# Patient Record
Sex: FEMALE | Race: WHITE | Employment: PART TIME | URBAN - METROPOLITAN AREA
[De-identification: names, ages, dates, MRNs, and addresses within clinical notes are randomized per-mention and may not be internally consistent; named-entity substitution may affect disease eponyms.]

---

## 2017-01-26 ENCOUNTER — OFFICE VISIT (OUTPATIENT)
Dept: FAMILY MEDICINE CLINIC | Facility: CLINIC | Age: 67
End: 2017-01-26

## 2017-01-26 VITALS
HEART RATE: 78 BPM | DIASTOLIC BLOOD PRESSURE: 84 MMHG | RESPIRATION RATE: 16 BRPM | BODY MASS INDEX: 29.71 KG/M2 | OXYGEN SATURATION: 98 % | SYSTOLIC BLOOD PRESSURE: 130 MMHG | HEIGHT: 63.5 IN | WEIGHT: 169.81 LBS | TEMPERATURE: 98 F

## 2017-01-26 DIAGNOSIS — Z23 NEED FOR TDAP VACCINATION: ICD-10-CM

## 2017-01-26 DIAGNOSIS — Z12.39 SCREENING FOR BREAST CANCER: ICD-10-CM

## 2017-01-26 DIAGNOSIS — E78.2 MIXED HYPERLIPIDEMIA: ICD-10-CM

## 2017-01-26 DIAGNOSIS — Z00.00 ROUTINE GENERAL MEDICAL EXAMINATION AT A HEALTH CARE FACILITY: Primary | ICD-10-CM

## 2017-01-26 DIAGNOSIS — K21.9 GASTROESOPHAGEAL REFLUX DISEASE WITHOUT ESOPHAGITIS: ICD-10-CM

## 2017-01-26 DIAGNOSIS — I25.10 CORONARY ARTERY DISEASE INVOLVING NATIVE CORONARY ARTERY OF NATIVE HEART WITHOUT ANGINA PECTORIS: ICD-10-CM

## 2017-01-26 DIAGNOSIS — Z00.00 BLOOD TESTS FOR ROUTINE GENERAL PHYSICAL EXAMINATION: ICD-10-CM

## 2017-01-26 PROBLEM — M21.621 BUNIONETTE OF RIGHT FOOT: Status: ACTIVE | Noted: 2017-01-26

## 2017-01-26 PROCEDURE — 90715 TDAP VACCINE 7 YRS/> IM: CPT | Performed by: FAMILY MEDICINE

## 2017-01-26 PROCEDURE — 90471 IMMUNIZATION ADMIN: CPT | Performed by: FAMILY MEDICINE

## 2017-01-26 PROCEDURE — 99397 PER PM REEVAL EST PAT 65+ YR: CPT | Performed by: FAMILY MEDICINE

## 2017-01-27 NOTE — PATIENT INSTRUCTIONS
Colonoscopy: Dr. Maday Alcantar from general surgery or [facility]. Let them know I sent you there so they will send a report back to me. Mammogram ordered. Fast 8 hours for labs. Water, black coffee, or plain tea only.   Any sugar in the system will alter the

## 2017-01-27 NOTE — PROGRESS NOTES
HPI:  Here for a physical.    PAST MEDICAL HISTORY:  Past Medical History   Diagnosis Date   • Allergic rhinitis    • Hyperlipidemia    • Cholelithiasis 10/21/2016   • Heart attack ([facility] [address] 1999     no intervention     PAST SURGICAL HISTORY:      Past Surgica Comment: social    Drug Use: No    Sexual Activity: Not on file   Not on file  Other Topics Concern   None on file     Social History Narrative        4 children    Never smoked     at [employer]    Occasional EtOH    No drugs         REVIEW OF SY in appearance, External canals patent and without inflammation. Bilateral tympanic membranes pearly white. No effusions noted. Hearing grossly normal.  EYES: PERRLA, EOMI, bilateral sclera anicteric, non-injected. Conjunctiva pink. Kyle Cliche   NOSE: Mucosa appears h diphtheria, pertussis booster : Updated today. Pneumovax if over 60 or medically indicated. Prevnar 13 followed 12 months later by pneumovax 23: Up-to-date  Shingles vaccine if over 60. Less expensive to obtain from pharmacy.   Up-to-date  [facility] informati

## 2017-02-13 ENCOUNTER — HOSPITAL ENCOUNTER (OUTPATIENT)
Dept: MAMMOGRAPHY | Age: 67
Discharge: HOME | End: 2017-02-13
Attending: FAMILY MEDICINE
Payer: COMMERCIAL

## 2017-02-13 ENCOUNTER — APPOINTMENT (OUTPATIENT)
Dept: LAB | Age: 67
End: 2017-02-13
Attending: FAMILY MEDICINE
Payer: COMMERCIAL

## 2017-02-13 DIAGNOSIS — Z00.00 BLOOD TESTS FOR ROUTINE GENERAL PHYSICAL EXAMINATION: ICD-10-CM

## 2017-02-13 DIAGNOSIS — I25.10 CORONARY ARTERY DISEASE INVOLVING NATIVE CORONARY ARTERY OF NATIVE HEART WITHOUT ANGINA PECTORIS: ICD-10-CM

## 2017-02-13 DIAGNOSIS — E78.2 MIXED HYPERLIPIDEMIA: ICD-10-CM

## 2017-02-13 DIAGNOSIS — Z12.39 SCREENING FOR BREAST CANCER: ICD-10-CM

## 2017-02-13 LAB
25-HYDROXYVITAMIN D (TOTAL): 34.8 NG/ML (ref 30–100)
ALBUMIN SERPL-MCNC: 3.3 G/DL (ref 3.5–4.8)
ALP LIVER SERPL-CCNC: 111 U/L (ref 55–142)
ALT SERPL-CCNC: 32 U/L (ref 14–54)
AST SERPL-CCNC: 22 U/L (ref 15–41)
BILIRUB SERPL-MCNC: 0.6 MG/DL (ref 0.1–2)
BUN BLD-MCNC: 16 MG/DL (ref 8–20)
CALCIUM BLD-MCNC: 8.4 MG/DL (ref 8.3–10.3)
CHLORIDE: 107 MMOL/L (ref 101–111)
CHOLEST SMN-MCNC: 123 MG/DL (ref ?–200)
CO2: 31 MMOL/L (ref 22–32)
CREAT BLD-MCNC: 0.96 MG/DL (ref 0.55–1.02)
GLUCOSE BLD-MCNC: 93 MG/DL (ref 70–99)
HDLC SERPL-MCNC: 66 MG/DL (ref 45–?)
HDLC SERPL: 1.86 {RATIO} (ref ?–4.44)
HEPATITIS C VIRUS AB INTERPRETATION: NONREACTIVE
LDLC SERPL CALC-MCNC: 41 MG/DL (ref ?–130)
M PROTEIN MFR SERPL ELPH: 7.1 G/DL (ref 6.1–8.3)
NONHDLC SERPL-MCNC: 57 MG/DL (ref ?–130)
POTASSIUM SERPL-SCNC: 4.1 MMOL/L (ref 3.6–5.1)
SODIUM SERPL-SCNC: 142 MMOL/L (ref 136–144)
TRIGLYCERIDES: 78 MG/DL (ref ?–150)
VLDL: 16 MG/DL (ref 5–40)

## 2017-02-13 PROCEDURE — 77067 SCR MAMMO BI INCL CAD: CPT

## 2017-02-13 PROCEDURE — 80061 LIPID PANEL: CPT

## 2017-02-13 PROCEDURE — 82306 VITAMIN D 25 HYDROXY: CPT

## 2017-02-13 PROCEDURE — 80053 COMPREHEN METABOLIC PANEL: CPT

## 2017-02-13 PROCEDURE — 77063 BREAST TOMOSYNTHESIS BI: CPT

## 2017-02-13 PROCEDURE — 86803 HEPATITIS C AB TEST: CPT

## 2017-02-13 PROCEDURE — 36415 COLL VENOUS BLD VENIPUNCTURE: CPT

## 2017-05-22 PROCEDURE — 87088 URINE BACTERIA CULTURE: CPT | Performed by: UROLOGY

## 2017-05-22 PROCEDURE — 81015 MICROSCOPIC EXAM OF URINE: CPT | Performed by: UROLOGY

## 2017-05-22 PROCEDURE — 87086 URINE CULTURE/COLONY COUNT: CPT | Performed by: UROLOGY

## 2017-05-22 PROCEDURE — 87186 SC STD MICRODIL/AGAR DIL: CPT | Performed by: UROLOGY

## 2017-08-08 ENCOUNTER — LAB ENCOUNTER (OUTPATIENT)
Dept: LAB | Age: 67
End: 2017-08-08
Attending: FAMILY MEDICINE
Payer: COMMERCIAL

## 2017-08-08 ENCOUNTER — OFFICE VISIT (OUTPATIENT)
Dept: FAMILY MEDICINE CLINIC | Facility: CLINIC | Age: 67
End: 2017-08-08

## 2017-08-08 VITALS
RESPIRATION RATE: 16 BRPM | TEMPERATURE: 99 F | HEIGHT: 65 IN | HEART RATE: 70 BPM | DIASTOLIC BLOOD PRESSURE: 76 MMHG | SYSTOLIC BLOOD PRESSURE: 104 MMHG | WEIGHT: 169 LBS | BODY MASS INDEX: 28.16 KG/M2

## 2017-08-08 DIAGNOSIS — I25.10 CORONARY ARTERY DISEASE INVOLVING NATIVE CORONARY ARTERY OF NATIVE HEART WITHOUT ANGINA PECTORIS: Primary | ICD-10-CM

## 2017-08-08 DIAGNOSIS — H61.23 BILATERAL IMPACTED CERUMEN: ICD-10-CM

## 2017-08-08 DIAGNOSIS — R94.31 ABNORMAL EKG: ICD-10-CM

## 2017-08-08 DIAGNOSIS — N39.0 RECURRENT UTI (URINARY TRACT INFECTION): ICD-10-CM

## 2017-08-08 DIAGNOSIS — E78.2 MIXED HYPERLIPIDEMIA: ICD-10-CM

## 2017-08-08 DIAGNOSIS — G89.29 CHRONIC MIDLINE LOW BACK PAIN WITHOUT SCIATICA: ICD-10-CM

## 2017-08-08 DIAGNOSIS — M54.50 CHRONIC MIDLINE LOW BACK PAIN WITHOUT SCIATICA: ICD-10-CM

## 2017-08-08 DIAGNOSIS — I25.10 CORONARY ARTERY DISEASE INVOLVING NATIVE CORONARY ARTERY OF NATIVE HEART WITHOUT ANGINA PECTORIS: ICD-10-CM

## 2017-08-08 LAB
ALBUMIN SERPL-MCNC: 3.4 G/DL (ref 3.5–4.8)
ALP LIVER SERPL-CCNC: 114 U/L (ref 55–142)
ALT SERPL-CCNC: 31 U/L (ref 14–54)
AST SERPL-CCNC: 20 U/L (ref 15–41)
BILIRUB SERPL-MCNC: 0.6 MG/DL (ref 0.1–2)
BILIRUB UR QL STRIP.AUTO: NEGATIVE
BUN BLD-MCNC: 18 MG/DL (ref 8–20)
CALCIUM BLD-MCNC: 9 MG/DL (ref 8.3–10.3)
CHLORIDE: 109 MMOL/L (ref 101–111)
CHOLEST SMN-MCNC: 121 MG/DL (ref ?–200)
CO2: 28 MMOL/L (ref 22–32)
COLOR UR AUTO: YELLOW
CREAT BLD-MCNC: 1.02 MG/DL (ref 0.55–1.02)
GLUCOSE BLD-MCNC: 86 MG/DL (ref 70–99)
GLUCOSE UR STRIP.AUTO-MCNC: NEGATIVE MG/DL
HDLC SERPL-MCNC: 55 MG/DL (ref 45–?)
HDLC SERPL: 2.2 {RATIO} (ref ?–4.44)
KETONES UR STRIP.AUTO-MCNC: NEGATIVE MG/DL
LDLC SERPL CALC-MCNC: 46 MG/DL (ref ?–130)
LDLC SERPL-MCNC: 20 MG/DL (ref 5–40)
M PROTEIN MFR SERPL ELPH: 7.3 G/DL (ref 6.1–8.3)
NITRITE UR QL STRIP.AUTO: POSITIVE
NONHDLC SERPL-MCNC: 66 MG/DL (ref ?–130)
PH UR STRIP.AUTO: 6 [PH] (ref 4.5–8)
POTASSIUM SERPL-SCNC: 4.3 MMOL/L (ref 3.6–5.1)
PROT UR STRIP.AUTO-MCNC: NEGATIVE MG/DL
RBC #/AREA URNS AUTO: >10 /HPF
SODIUM SERPL-SCNC: 144 MMOL/L (ref 136–144)
SP GR UR STRIP.AUTO: 1.01 (ref 1–1.03)
TRIGLYCERIDES: 100 MG/DL (ref ?–150)
UROBILINOGEN UR STRIP.AUTO-MCNC: <2 MG/DL
WBC #/AREA URNS AUTO: >50 /HPF
WBC CLUMPS UR QL AUTO: PRESENT

## 2017-08-08 PROCEDURE — 80061 LIPID PANEL: CPT

## 2017-08-08 PROCEDURE — 36415 COLL VENOUS BLD VENIPUNCTURE: CPT

## 2017-08-08 PROCEDURE — 99214 OFFICE O/P EST MOD 30 MIN: CPT | Performed by: FAMILY MEDICINE

## 2017-08-08 PROCEDURE — 80053 COMPREHEN METABOLIC PANEL: CPT

## 2017-08-08 PROCEDURE — 81001 URINALYSIS AUTO W/SCOPE: CPT

## 2017-08-08 NOTE — PATIENT INSTRUCTIONS
Cerumenex or Debrox to both ears every night for 3 nights will help to loosen up wax. You may do this once a month.     Call [phone] to schedule your exam.  MRI lumbar spine

## 2017-08-08 NOTE — PROGRESS NOTES
Here for laboratories for hyperlipidemia. History of coronary artery disease followed by cardiology. Dr. Chaparro Lees has the lipid panel pending. Has chronic ringing in the ears. Sometimes gets a discomfort. No discharge.     Has history of recurrent urinar Vitamins-Minerals (MULTIVITAL OR) Take 1 tablet by mouth daily. Disp:  Rfl:    aspirin 81 MG Oral Tab Take 81 mg by mouth daily.  Disp:  Rfl:      ALLERGIES:   Morphine; Sulfa Antibiotics  FAMILY HISTORY  Family History   Problem Relation Age of Onset   •

## 2017-08-09 ENCOUNTER — TELEPHONE (OUTPATIENT)
Dept: FAMILY MEDICINE CLINIC | Facility: CLINIC | Age: 67
End: 2017-08-09

## 2017-08-09 DIAGNOSIS — N39.0 URINARY TRACT INFECTION WITH HEMATURIA, SITE UNSPECIFIED: Primary | ICD-10-CM

## 2017-08-09 DIAGNOSIS — R31.9 URINARY TRACT INFECTION WITH HEMATURIA, SITE UNSPECIFIED: Primary | ICD-10-CM

## 2017-08-09 RX ORDER — NITROFURANTOIN 25; 75 MG/1; MG/1
100 CAPSULE ORAL 2 TIMES DAILY
Qty: 10 CAPSULE | Refills: 0 | Status: SHIPPED | OUTPATIENT
Start: 2017-08-09 | End: 2017-08-14 | Stop reason: WASHOUT

## 2017-08-09 NOTE — TELEPHONE ENCOUNTER
----- Message from Karina Donahue MD sent at 8/9/2017  8:06 AM CDT -----  History of frequent UTI. Patient is asymptomatic but urinalysis is strongly positive for infection. Allergic to sulfa. Start Macrobid 100 mg twice a day for 5 days.   Recheck urin

## 2017-08-17 ENCOUNTER — LAB ENCOUNTER (OUTPATIENT)
Dept: LAB | Age: 67
End: 2017-08-17
Attending: FAMILY MEDICINE
Payer: COMMERCIAL

## 2017-08-17 DIAGNOSIS — N39.0 URINARY TRACT INFECTION WITH HEMATURIA, SITE UNSPECIFIED: ICD-10-CM

## 2017-08-17 DIAGNOSIS — R31.9 URINARY TRACT INFECTION WITH HEMATURIA, SITE UNSPECIFIED: ICD-10-CM

## 2017-08-17 LAB
BILIRUB UR QL STRIP.AUTO: NEGATIVE
CLARITY UR REFRACT.AUTO: CLEAR
COLOR UR AUTO: YELLOW
GLUCOSE UR STRIP.AUTO-MCNC: NEGATIVE MG/DL
KETONES UR STRIP.AUTO-MCNC: NEGATIVE MG/DL
NITRITE UR QL STRIP.AUTO: NEGATIVE
PH UR STRIP.AUTO: 6 [PH] (ref 4.5–8)
PROT UR STRIP.AUTO-MCNC: NEGATIVE MG/DL
RBC UR QL AUTO: NEGATIVE
SP GR UR STRIP.AUTO: 1.01 (ref 1–1.03)
UROBILINOGEN UR STRIP.AUTO-MCNC: <2 MG/DL

## 2017-08-17 PROCEDURE — 87086 URINE CULTURE/COLONY COUNT: CPT | Performed by: FAMILY MEDICINE

## 2017-08-17 PROCEDURE — 81001 URINALYSIS AUTO W/SCOPE: CPT | Performed by: FAMILY MEDICINE

## 2017-08-22 ENCOUNTER — HOSPITAL ENCOUNTER (OUTPATIENT)
Dept: MRI IMAGING | Age: 67
Discharge: HOME | End: 2017-08-22
Attending: FAMILY MEDICINE
Payer: COMMERCIAL

## 2017-08-22 ENCOUNTER — TELEPHONE (OUTPATIENT)
Dept: FAMILY MEDICINE CLINIC | Facility: CLINIC | Age: 67
End: 2017-08-22

## 2017-08-22 DIAGNOSIS — M54.50 CHRONIC MIDLINE LOW BACK PAIN WITHOUT SCIATICA: ICD-10-CM

## 2017-08-22 DIAGNOSIS — G89.29 CHRONIC MIDLINE LOW BACK PAIN WITHOUT SCIATICA: ICD-10-CM

## 2017-08-22 PROCEDURE — 72148 MRI LUMBAR SPINE W/O DYE: CPT | Performed by: FAMILY MEDICINE

## 2017-08-22 NOTE — TELEPHONE ENCOUNTER
See phone call for spine recommendations. Bilateral hydronephrosis.   Recommend CT of the abdomen without contrast.

## 2017-08-22 NOTE — TELEPHONE ENCOUNTER
Ilir Shelton at Radiology Dept phoned in stating Radiologist request a call to office for [facility] to review recent MRI report. Available in Epic for review.

## 2017-09-25 ENCOUNTER — OFFICE VISIT (OUTPATIENT)
Dept: SURGERY | Facility: CLINIC | Age: 67
End: 2017-09-25

## 2017-09-25 VITALS
DIASTOLIC BLOOD PRESSURE: 62 MMHG | HEIGHT: 65 IN | WEIGHT: 170 LBS | BODY MASS INDEX: 28.32 KG/M2 | HEART RATE: 82 BPM | SYSTOLIC BLOOD PRESSURE: 118 MMHG

## 2017-09-25 DIAGNOSIS — M51.36 DDD (DEGENERATIVE DISC DISEASE), LUMBAR: ICD-10-CM

## 2017-09-25 DIAGNOSIS — M43.16 SPONDYLOLISTHESIS OF LUMBAR REGION: ICD-10-CM

## 2017-09-25 DIAGNOSIS — M54.50 CHRONIC LOW BACK PAIN WITHOUT SCIATICA, UNSPECIFIED BACK PAIN LATERALITY: Primary | ICD-10-CM

## 2017-09-25 DIAGNOSIS — G89.29 CHRONIC LOW BACK PAIN WITHOUT SCIATICA, UNSPECIFIED BACK PAIN LATERALITY: Primary | ICD-10-CM

## 2017-09-25 DIAGNOSIS — M47.816 FACET ARTHRITIS OF LUMBAR REGION: ICD-10-CM

## 2017-09-25 PROCEDURE — 99214 OFFICE O/P EST MOD 30 MIN: CPT | Performed by: PHYSICIAN ASSISTANT

## 2017-09-25 NOTE — PROGRESS NOTES
Location of Pain: middle of low back, and tingling in bottems of feet    Date Pain Began: couple of years          Work Related:   No        Receiving Work Comp/Disability:   No    Numeric Rating Scale:  Pain at Present:  3

## 2017-09-25 NOTE — H&P
NEUROSURGERY CLINIC VISIT      Bolivar Clement  2/9/1950      Patient presents with:  Low Back Pain        HPI:   Bolivar Clement is a 75year old female with PMH of angioplasty (no stents, 1999), 10/21/2016   • Heart attack ([facility] [address] 1999    no intervention   • Hyperlipidemia       Past Surgical History:  No date: ANGIOGRAM      Comment: no stents  2005: APPENDECTOMY  2016: CATARACT  1/17/2011: COLONOSCOPY      Comment: Dr. Parag Lora, 1 polyp  11/15/2016: C Wt 170 lb   BMI 28.29 kg/m²   GENERAL: Alert and oriented to person place time event.  Pleasant, no apparent distress. HEART: Regular rate and rhythm  LUNGS: Easy and even  NEUROLOGICAL:  This patient is alert and orientated x 3. Speech fluent.  Comprehe acquired in sagittal and axial planes. FINDINGS:    There is normal lumbar lordosis with trace anterolisthesis of L4 and L5 by 5 mm and trace retrolisthesis of L5 on S1 by 2 mm. Vertebral body heights are well-maintained.  Desiccation and disc height spinal canal stenosis with right greater the left subarticular zone narrowing. There is no significant neural foraminal stenosis. L5-S1: Diffuse disc bulge with endplate osteophytes and a superimposed left foraminal/far lateral disc osteophyte complex. seeing the chiropractor, as patient states it provides relief. 4. Follow up in 6 weeks or sooner for any new, worsening or concerning signs or symptoms.  Red flag or warning signs were discussed, including but not limited to, bladder/bowel incontinence/ret

## 2017-09-25 NOTE — PATIENT INSTRUCTIONS
Refill policies:    • Allow 2-3 business days for refills; controlled substances may take longer.   • Contact your pharmacy at least 5 days prior to running out of medication and have them send an electronic request or submit request through the [facility] have a procedure or additional testing performed. [facility]) will contact your insurance carrier to obtain pre-certification or prior authorization.     Unfortunately, [facility] has seen an increase in denial of payment even though the p

## 2017-09-27 ENCOUNTER — TELEPHONE (OUTPATIENT)
Dept: FAMILY MEDICINE CLINIC | Facility: CLINIC | Age: 67
End: 2017-09-27

## 2017-09-27 DIAGNOSIS — R93.89 ABNORMAL MRI: Primary | ICD-10-CM

## 2017-09-27 DIAGNOSIS — N13.30 HYDRONEPHROSIS, UNSPECIFIED HYDRONEPHROSIS TYPE: ICD-10-CM

## 2017-09-27 NOTE — TELEPHONE ENCOUNTER
MRI of the lumbar spine dated August 22, 2017 shows hydronephrosis of the kidneys. Ultrasound of the kidneys from February does not show this finding. Radiology recommended a CT of the kidneys to assess causes of hydronephrosis.   CT without contrast.

## 2017-09-27 NOTE — TELEPHONE ENCOUNTER
PT STATES THAT [facility] WANTS HER TO HAVE A CT OF KIDNEYS. SHE WOULD LIKE AN ORDER FOR THAT PUT IN THE SYSTEM. CALL HER WHEN ORDER IN SYSTEM.

## 2017-09-28 NOTE — TELEPHONE ENCOUNTER
Entered order, [facility] for pt with this information and pre-authorization process. Task completed.

## 2017-10-04 ENCOUNTER — TELEPHONE (OUTPATIENT)
Dept: FAMILY MEDICINE CLINIC | Facility: CLINIC | Age: 67
End: 2017-10-04

## 2017-10-04 DIAGNOSIS — R39.89 URINARY PROBLEM: Primary | ICD-10-CM

## 2017-10-04 NOTE — TELEPHONE ENCOUNTER
Pt called stated she has a history of uti problems and pt is experiencing symptoms of a uti, pt is asking if she can get an order to provide a sample by today. pt was just here not too long ago. Please call pt and advise.

## 2017-10-04 NOTE — TELEPHONE ENCOUNTER
Order entered in [employer] system. LMOM for pt with this information. Task completed.

## 2017-10-04 NOTE — TELEPHONE ENCOUNTER
See attached message. Recurrent UTI in pt's problem list.  Last OV RC 8-8-17 RC CAD. Last Urinalysis and CX 8-17-17 Negative. Approve for UA/CX orders?

## 2017-10-05 ENCOUNTER — LAB ENCOUNTER (OUTPATIENT)
Dept: LAB | Age: 67
End: 2017-10-05
Attending: FAMILY MEDICINE
Payer: COMMERCIAL

## 2017-10-05 DIAGNOSIS — R39.89 URINARY PROBLEM: ICD-10-CM

## 2017-10-05 PROCEDURE — 87086 URINE CULTURE/COLONY COUNT: CPT | Performed by: FAMILY MEDICINE

## 2017-10-05 PROCEDURE — 87088 URINE BACTERIA CULTURE: CPT | Performed by: FAMILY MEDICINE

## 2017-10-05 PROCEDURE — 81001 URINALYSIS AUTO W/SCOPE: CPT | Performed by: FAMILY MEDICINE

## 2017-10-05 PROCEDURE — 87186 SC STD MICRODIL/AGAR DIL: CPT | Performed by: FAMILY MEDICINE

## 2017-10-05 RX ORDER — NITROFURANTOIN 25; 75 MG/1; MG/1
100 CAPSULE ORAL 2 TIMES DAILY
Qty: 14 CAPSULE | Refills: 0 | Status: SHIPPED | OUTPATIENT
Start: 2017-10-05 | End: 2017-10-12 | Stop reason: WASHOUT

## 2017-10-09 ENCOUNTER — HOSPITAL ENCOUNTER (OUTPATIENT)
Dept: CT IMAGING | Age: 67
Discharge: HOME | End: 2017-10-09
Attending: FAMILY MEDICINE
Payer: COMMERCIAL

## 2017-10-09 DIAGNOSIS — N13.30 HYDRONEPHROSIS, UNSPECIFIED HYDRONEPHROSIS TYPE: ICD-10-CM

## 2017-10-09 DIAGNOSIS — R93.89 ABNORMAL MRI: ICD-10-CM

## 2017-10-09 PROCEDURE — 74176 CT ABD & PELVIS W/O CONTRAST: CPT | Performed by: FAMILY MEDICINE

## 2017-10-19 ENCOUNTER — OFFICE VISIT (OUTPATIENT)
Dept: FAMILY MEDICINE CLINIC | Facility: CLINIC | Age: 67
End: 2017-10-19

## 2017-10-19 ENCOUNTER — TELEPHONE (OUTPATIENT)
Dept: FAMILY MEDICINE CLINIC | Facility: CLINIC | Age: 67
End: 2017-10-19

## 2017-10-19 VITALS
DIASTOLIC BLOOD PRESSURE: 80 MMHG | BODY MASS INDEX: 28.99 KG/M2 | OXYGEN SATURATION: 95 % | HEIGHT: 65 IN | HEART RATE: 82 BPM | TEMPERATURE: 98 F | SYSTOLIC BLOOD PRESSURE: 138 MMHG | WEIGHT: 174 LBS | RESPIRATION RATE: 20 BRPM

## 2017-10-19 DIAGNOSIS — N39.0 URINARY TRACT INFECTION WITHOUT HEMATURIA, SITE UNSPECIFIED: Primary | ICD-10-CM

## 2017-10-19 PROCEDURE — 99213 OFFICE O/P EST LOW 20 MIN: CPT | Performed by: FAMILY MEDICINE

## 2017-10-19 PROCEDURE — 87086 URINE CULTURE/COLONY COUNT: CPT | Performed by: FAMILY MEDICINE

## 2017-10-19 PROCEDURE — 81003 URINALYSIS AUTO W/O SCOPE: CPT | Performed by: FAMILY MEDICINE

## 2017-10-19 RX ORDER — AZITHROMYCIN 250 MG/1
TABLET, FILM COATED ORAL
Qty: 6 TABLET | Refills: 0 | Status: SHIPPED | OUTPATIENT
Start: 2017-10-19 | End: 2017-10-26 | Stop reason: ALTCHOICE

## 2017-10-19 NOTE — PROGRESS NOTES
Here with recurrent UTI symptoms. Treated her with antibiotics in the last few weeks. No vaginal discharge but feels different down there. [place] earlier in the week or last week and required Azo to control the UTI symptoms.   She does Disp:  Rfl:      ALLERGIES:   Morphine; Sulfa Antibiotics  FAMILY HISTORY  Family History   Problem Relation Age of Onset   • Cancer Mother      liver   • cirrhosis [OTHER] Mother    • gallbladder attack Jimi Hurtado Mother    • Heart Attack Paternal Grandfathe

## 2017-10-19 NOTE — TELEPHONE ENCOUNTER
Pt was wondering if there is an antibiotic she can take on a regular basis to help prevent her  recurrent UTIs. He urologist suggested that she ask her PCP about it. Pt forgot to ask when she was in for office visit.  Please advise

## 2017-10-23 ENCOUNTER — OFFICE VISIT (OUTPATIENT)
Dept: PHYSICAL THERAPY | Age: 67
End: 2017-10-23
Attending: NEUROLOGICAL SURGERY
Payer: COMMERCIAL

## 2017-10-23 PROCEDURE — 97112 NEUROMUSCULAR REEDUCATION: CPT

## 2017-10-23 PROCEDURE — 97161 PT EVAL LOW COMPLEX 20 MIN: CPT

## 2017-10-24 NOTE — PROGRESS NOTES
SPINE EVALUATION:   Referring Physician: Dr. Linda Gaspar  Diagnosis: Localized LBP  Date of Service: 10/23/17     PATIENT SUMMARY   Arnoldo Moura is a 75year old y/o female who presents to therapy today with complaints of localized/central low back pain whic to tolerate standing at work full shift without need for ibuprofen or ice at the end of the day. Precautions:  None  OBJECTIVE:   Observation/Posture: sitting comfortably during evaluation.  Decreased core engagement in unsupported sitting  Gait: unremar PT (6 visits)    Frequency / Duration: Patient will be seen for 1x/week or biweekly for a total of 6 visits over a 90 day period. Treatment will include: Manual Therapy; Therapeutic Exercises; Neuromuscular Re-education;  Therapeutic Activity;  HEP    Adriana Emanuelver

## 2017-10-26 ENCOUNTER — OFFICE VISIT (OUTPATIENT)
Dept: PHYSICAL THERAPY | Age: 67
End: 2017-10-26
Attending: NEUROLOGICAL SURGERY
Payer: COMMERCIAL

## 2017-10-26 ENCOUNTER — OFFICE VISIT (OUTPATIENT)
Dept: NEPHROLOGY | Facility: CLINIC | Age: 67
End: 2017-10-26

## 2017-10-26 VITALS — SYSTOLIC BLOOD PRESSURE: 108 MMHG | WEIGHT: 173 LBS | DIASTOLIC BLOOD PRESSURE: 66 MMHG | BODY MASS INDEX: 29 KG/M2

## 2017-10-26 DIAGNOSIS — N28.89 PELVICALIECTASIS: ICD-10-CM

## 2017-10-26 DIAGNOSIS — N28.1 PARAPELVIC RENAL CYST: Primary | ICD-10-CM

## 2017-10-26 DIAGNOSIS — N39.0 URINARY TRACT INFECTION WITHOUT HEMATURIA, SITE UNSPECIFIED: ICD-10-CM

## 2017-10-26 PROCEDURE — 97112 NEUROMUSCULAR REEDUCATION: CPT

## 2017-10-26 PROCEDURE — 99243 OFF/OP CNSLTJ NEW/EST LOW 30: CPT | Performed by: INTERNAL MEDICINE

## 2017-10-26 NOTE — PROGRESS NOTES
Dx: Localized LBP         Authorized # of Visits:  Medicare/BCBS         Next MD visit: none scheduled  Fall Risk: standard         Precautions: n/a             Subjective: Did the HEP a couple times; has a hard time on her knees for the alt UE/LE exercise Seated on SB pelvic tilts* A/P and M/L x10 ea (mild sore A/P)         Seated on SB  -March 2>0 UE x20 (fair)*  -alt UE/LE x15 (fair)*  -green spri rows 2x10         Tandem stance x30s ea (good)  -with EC x30s ea (fair)         airex march x20 (fair)

## 2017-10-26 NOTE — PROGRESS NOTES
Nephrology Consult Note    REASON FOR CONSULT: Abnormal CT findings; frequent UTIs    ASSESSMENT/PLAN:        1) Abnormal CT findings- has chronic findings of bilateral parapelvic cysts and possible mild UPJ obstruction / pelvicaliectasis which have been p habits or gross hematuria  Denies LE edema  Denies skin rashes/myalgias/arthralgias    PMH:  Past Medical History:   Diagnosis Date   • Allergic rhinitis    • Cholelithiasis 10/21/2016   • Heart attack 1999    no intervention   • Hyperlipidemia        PSH: Alcohol use:  Yes                Comment: social    Drug use: No            Social History Narrative        4 children    Never smoked     at [employer]    Occasional EtOH    No drugs         Family History:  Family History   Problem Relation Age

## 2017-11-02 ENCOUNTER — OFFICE VISIT (OUTPATIENT)
Dept: PHYSICAL THERAPY | Age: 67
End: 2017-11-02
Attending: NEUROLOGICAL SURGERY
Payer: COMMERCIAL

## 2017-11-02 PROCEDURE — 97112 NEUROMUSCULAR REEDUCATION: CPT

## 2017-11-02 PROCEDURE — 97140 MANUAL THERAPY 1/> REGIONS: CPT

## 2017-11-02 NOTE — PROGRESS NOTES
Dx: Localized LBP         Authorized # of Visits:  Medicare/BCBS         Next MD visit: none scheduled  Fall Risk: standard         Precautions: n/a             Subjective: Did the HEP most days; did not do it one day because knees were acting up.  Exercise TX#: 7/ Date:               TX#: 8/   NuStep L4 x4 min x5 min        SB review:  -DKTC  -LTR  -Bridges  -SLR Bridges with YTB Abd x10        Supine TA* 5s hold x10 hooklying dead bug x15        Supine TA with march* x15 x20        Supine TA with SLR* x15 x

## 2017-11-06 ENCOUNTER — OFFICE VISIT (OUTPATIENT)
Dept: SURGERY | Facility: CLINIC | Age: 67
End: 2017-11-06

## 2017-11-06 VITALS
WEIGHT: 170 LBS | HEART RATE: 72 BPM | HEIGHT: 65 IN | SYSTOLIC BLOOD PRESSURE: 122 MMHG | BODY MASS INDEX: 28.32 KG/M2 | DIASTOLIC BLOOD PRESSURE: 70 MMHG

## 2017-11-06 DIAGNOSIS — M51.36 DDD (DEGENERATIVE DISC DISEASE), LUMBAR: ICD-10-CM

## 2017-11-06 DIAGNOSIS — M47.816 FACET ARTHRITIS OF LUMBAR REGION: ICD-10-CM

## 2017-11-06 DIAGNOSIS — G89.29 CHRONIC LOW BACK PAIN WITHOUT SCIATICA, UNSPECIFIED BACK PAIN LATERALITY: Primary | ICD-10-CM

## 2017-11-06 DIAGNOSIS — M54.50 CHRONIC LOW BACK PAIN WITHOUT SCIATICA, UNSPECIFIED BACK PAIN LATERALITY: Primary | ICD-10-CM

## 2017-11-06 DIAGNOSIS — M43.16 SPONDYLOLISTHESIS OF LUMBAR REGION: ICD-10-CM

## 2017-11-06 PROCEDURE — 99214 OFFICE O/P EST MOD 30 MIN: CPT | Performed by: PHYSICIAN ASSISTANT

## 2017-11-06 NOTE — PATIENT INSTRUCTIONS
Refill policies:    • Allow 2-3 business days for refills; controlled substances may take longer.   • Contact your pharmacy at least 5 days prior to running out of medication and have them send an electronic request or submit request through the [facility] have a procedure or additional testing performed. [facility]) will contact your insurance carrier to obtain pre-certification or prior authorization.     Unfortunately, [facility] has seen an increase in denial of payment even though the p

## 2017-11-06 NOTE — PROGRESS NOTES
Neurosurgery   Lumbar Follow up      HISTORY OF PRESENT ILLNESS:Grace Verduzco is a 75year old female who presents for imaging results.  Patient states she has been attending physical therapy and has noticed her back pain and burning/cramping of right thig Chronic low back pain without sciatica, unspecified back pain laterality  (primary encounter diagnosis)    (M51.36) DDD (degenerative disc disease), lumbar    (M43.16) Spondylolisthesis of lumbar region    (M46.96) Facet arthritis of lumbar region ([facility] [address]

## 2017-11-13 ENCOUNTER — TELEPHONE (OUTPATIENT)
Dept: FAMILY MEDICINE CLINIC | Facility: CLINIC | Age: 67
End: 2017-11-13

## 2017-11-13 RX ORDER — NITROFURANTOIN 25; 75 MG/1; MG/1
100 CAPSULE ORAL DAILY
Qty: 30 CAPSULE | Refills: 2 | Status: SHIPPED | OUTPATIENT
Start: 2017-11-13 | End: 2018-02-21

## 2017-11-16 ENCOUNTER — APPOINTMENT (OUTPATIENT)
Dept: PHYSICAL THERAPY | Age: 67
End: 2017-11-16
Attending: NEUROLOGICAL SURGERY
Payer: COMMERCIAL

## 2017-11-27 ENCOUNTER — APPOINTMENT (OUTPATIENT)
Dept: PHYSICAL THERAPY | Age: 67
End: 2017-11-27
Attending: NEUROLOGICAL SURGERY
Payer: COMMERCIAL

## 2017-11-30 ENCOUNTER — OFFICE VISIT (OUTPATIENT)
Dept: PHYSICAL THERAPY | Age: 67
End: 2017-11-30
Attending: NEUROLOGICAL SURGERY
Payer: COMMERCIAL

## 2017-11-30 PROCEDURE — 97140 MANUAL THERAPY 1/> REGIONS: CPT

## 2017-11-30 PROCEDURE — 97112 NEUROMUSCULAR REEDUCATION: CPT

## 2017-12-01 NOTE — PROGRESS NOTES
Discharge Summary  Pt has attended 4 visits in Physical Therapy. Dx: Localized LBP         Authorized # of Visits:  Medicare/BCBS         Subjective/Assessment: Back has been good overall since last visit. Does the HEP, but not every day.  Has been abl have any questions, please contact me at Dept: [phone].     Sincerely,  Electronically signed by therapist: Shira Lux, PT      Date: 10/26/2017 TX#: 2/6 Date: 11/2/2017 TX#: 3/6   Date: 11/30/2017 TX#: 4/6 Date:               TX#: 5/ Date:

## 2018-02-21 ENCOUNTER — HOSPITAL ENCOUNTER (OUTPATIENT)
Dept: GENERAL RADIOLOGY | Age: 68
Discharge: HOME | End: 2018-02-21
Attending: FAMILY MEDICINE
Payer: COMMERCIAL

## 2018-02-21 ENCOUNTER — TELEPHONE (OUTPATIENT)
Dept: FAMILY MEDICINE CLINIC | Facility: CLINIC | Age: 68
End: 2018-02-21

## 2018-02-21 ENCOUNTER — OFFICE VISIT (OUTPATIENT)
Dept: FAMILY MEDICINE CLINIC | Facility: CLINIC | Age: 68
End: 2018-02-21

## 2018-02-21 VITALS
TEMPERATURE: 98 F | DIASTOLIC BLOOD PRESSURE: 78 MMHG | OXYGEN SATURATION: 98 % | HEIGHT: 65 IN | SYSTOLIC BLOOD PRESSURE: 118 MMHG | WEIGHT: 175 LBS | HEART RATE: 80 BPM | BODY MASS INDEX: 29.16 KG/M2 | RESPIRATION RATE: 18 BRPM

## 2018-02-21 DIAGNOSIS — Z00.00 ROUTINE GENERAL MEDICAL EXAMINATION AT A HEALTH CARE FACILITY: Primary | ICD-10-CM

## 2018-02-21 DIAGNOSIS — M54.16 LUMBAR BACK PAIN WITH RADICULOPATHY AFFECTING RIGHT LOWER EXTREMITY: ICD-10-CM

## 2018-02-21 DIAGNOSIS — N39.0 RECURRENT UTI (URINARY TRACT INFECTION): ICD-10-CM

## 2018-02-21 DIAGNOSIS — Z12.39 SCREENING FOR BREAST CANCER: ICD-10-CM

## 2018-02-21 PROCEDURE — 99397 PER PM REEVAL EST PAT 65+ YR: CPT | Performed by: FAMILY MEDICINE

## 2018-02-21 PROCEDURE — 72110 X-RAY EXAM L-2 SPINE 4/>VWS: CPT | Performed by: FAMILY MEDICINE

## 2018-02-21 RX ORDER — NITROFURANTOIN 25; 75 MG/1; MG/1
100 CAPSULE ORAL DAILY
Qty: 90 CAPSULE | Refills: 3 | Status: SHIPPED | OUTPATIENT
Start: 2018-02-21 | End: 2019-05-30

## 2018-02-21 NOTE — PROGRESS NOTES
HPI:  Here for a physical.    PAST MEDICAL HISTORY:  Past Medical History:   Diagnosis Date   • Allergic rhinitis    • Cholelithiasis 10/21/2016   • Heart attack ([facility] [address] 1999    no intervention   • Hyperlipidemia      PAST SURGICAL HISTORY:  Past Surgical Hi Marital status:   Spouse name: N/A    Years of education: N/A  Number of children: N/A     Occupational History  None on file     Social History Main Topics   Smoking status: Never Smoker    Smokeless tobacco: Never Used    Alcohol use Yes    Commen developed, well nourished, in no apparent distress. EARS: Bilateral pinna / tragus are WNL in appearance. External canals patent and without inflammation. Bilateral tympanic membranes pearly white. No effusions noted.  Hearing grossly normal.  EYES: PERRLA ordered  Labs ordered: none, reviewed lipids from fall with Dr. Jose Juan Carlson. Administer Tetanus, diphtheria, pertussis booster : utd. Pneumovax if over 65 or medically indicated.    Prevnar 13 followed 12 months later by pneumovax 23: utd  Shingles vaccine

## 2018-02-26 ENCOUNTER — TELEPHONE (OUTPATIENT)
Dept: FAMILY MEDICINE CLINIC | Facility: CLINIC | Age: 68
End: 2018-02-26

## 2018-02-26 NOTE — TELEPHONE ENCOUNTER
Pt called to let the nurse and her pcp know that she will be doing the back exercises her self, she will not be going to px therapy nor see the spine doctor for now, she will see how it goes with the exercises.  Please call pt if you have any questions herbie

## 2018-04-23 PROBLEM — F32.1 CURRENT MODERATE EPISODE OF MAJOR DEPRESSIVE DISORDER WITHOUT PRIOR EPISODE (HCC): Status: ACTIVE | Noted: 2018-04-23

## 2018-04-23 NOTE — PROGRESS NOTES
Has a history of depression treated with pills many years ago. Has been feeling depressed again. She is working for 4 hour shifts at [employer] Wednesday through Saturday. She has 2 grown kids here in [place] and 2 grown kids in [place].   She is to take ca Oral Tab Take 1 tablet (10 mg total) by mouth daily. Disp: 30 tablet Rfl: 1   Nitrofurantoin Monohyd Macro 100 MG Oral Cap Take 1 capsule (100 mg total) by mouth daily.  Disp: 90 capsule Rfl: 3   Meloxicam 15 MG Oral Tab TAKE ONE TABLET BY MOUTH EVERY DAY A

## 2018-05-21 NOTE — PROGRESS NOTES
Here in follow-up for major depression. Is doing much better. Met with Dr. Larry Sands from the office a few times. Has 1 more visit next month. Is more energetic at work and at home. Feels like the symptoms have been lifted greatly.   Denies side effects Take 1 tablet by mouth daily. Disp:  Rfl:    aspirin 81 MG Oral Tab Take 81 mg by mouth daily.  Disp:  Rfl:      ALLERGIES:   Morphine; Sulfa Antibiotics  FAMILY HISTORY  Family History   Problem Relation Age of Onset   • Cancer Mother      liver   • cirr

## 2018-06-30 DIAGNOSIS — F32.1 CURRENT MODERATE EPISODE OF MAJOR DEPRESSIVE DISORDER WITHOUT PRIOR EPISODE (HCC): ICD-10-CM

## 2018-07-03 RX ORDER — CITALOPRAM 10 MG/1
TABLET ORAL
Qty: 30 TABLET | Refills: 0 | Status: SHIPPED | OUTPATIENT
Start: 2018-07-03 | End: 2018-08-03

## 2018-08-03 DIAGNOSIS — F32.1 CURRENT MODERATE EPISODE OF MAJOR DEPRESSIVE DISORDER WITHOUT PRIOR EPISODE (HCC): ICD-10-CM

## 2018-08-03 RX ORDER — CITALOPRAM 10 MG/1
10 TABLET ORAL
Qty: 30 TABLET | Refills: 0 | Status: SHIPPED | OUTPATIENT
Start: 2018-08-03 | End: 2018-09-07

## 2018-09-07 DIAGNOSIS — F32.1 CURRENT MODERATE EPISODE OF MAJOR DEPRESSIVE DISORDER WITHOUT PRIOR EPISODE (HCC): ICD-10-CM

## 2018-09-07 RX ORDER — CITALOPRAM 10 MG/1
TABLET ORAL
Qty: 30 TABLET | Refills: 0 | Status: SHIPPED | OUTPATIENT
Start: 2018-09-07 | End: 2018-10-11

## 2018-10-11 DIAGNOSIS — F32.1 CURRENT MODERATE EPISODE OF MAJOR DEPRESSIVE DISORDER WITHOUT PRIOR EPISODE (HCC): ICD-10-CM

## 2018-10-12 RX ORDER — CITALOPRAM 10 MG/1
TABLET ORAL
Qty: 30 TABLET | Refills: 0 | Status: SHIPPED | OUTPATIENT
Start: 2018-10-12 | End: 2018-11-16

## 2018-11-06 ENCOUNTER — APPOINTMENT (OUTPATIENT)
Dept: LAB | Age: 68
End: 2018-11-06
Attending: INTERNAL MEDICINE
Payer: COMMERCIAL

## 2018-11-06 DIAGNOSIS — I25.10 CORONARY ARTERY DISEASE INVOLVING NATIVE CORONARY ARTERY OF NATIVE HEART WITHOUT ANGINA PECTORIS: ICD-10-CM

## 2018-11-06 DIAGNOSIS — E78.00 PURE HYPERCHOLESTEROLEMIA: ICD-10-CM

## 2018-11-06 PROCEDURE — 80061 LIPID PANEL: CPT

## 2018-11-06 PROCEDURE — 36415 COLL VENOUS BLD VENIPUNCTURE: CPT

## 2018-11-16 DIAGNOSIS — F32.1 CURRENT MODERATE EPISODE OF MAJOR DEPRESSIVE DISORDER WITHOUT PRIOR EPISODE (HCC): ICD-10-CM

## 2018-11-16 RX ORDER — CITALOPRAM 10 MG/1
TABLET ORAL
Qty: 30 TABLET | Refills: 0 | Status: SHIPPED | OUTPATIENT
Start: 2018-11-16 | End: 2019-01-11

## 2019-01-11 DIAGNOSIS — F32.1 CURRENT MODERATE EPISODE OF MAJOR DEPRESSIVE DISORDER WITHOUT PRIOR EPISODE (HCC): ICD-10-CM

## 2019-01-11 RX ORDER — CITALOPRAM 10 MG/1
TABLET ORAL
Qty: 30 TABLET | Refills: 0 | Status: SHIPPED | OUTPATIENT
Start: 2019-01-11 | End: 2019-02-25

## 2019-02-15 ENCOUNTER — OFFICE VISIT (OUTPATIENT)
Dept: FAMILY MEDICINE CLINIC | Facility: CLINIC | Age: 69
End: 2019-02-15
Payer: COMMERCIAL

## 2019-02-15 ENCOUNTER — HOSPITAL ENCOUNTER (EMERGENCY)
Age: 69
Discharge: HOME | End: 2019-02-15
Attending: EMERGENCY MEDICINE
Payer: COMMERCIAL

## 2019-02-15 ENCOUNTER — APPOINTMENT (OUTPATIENT)
Dept: GENERAL RADIOLOGY | Age: 69
End: 2019-02-15
Attending: EMERGENCY MEDICINE
Payer: COMMERCIAL

## 2019-02-15 VITALS
OXYGEN SATURATION: 98 % | BODY MASS INDEX: 28.49 KG/M2 | SYSTOLIC BLOOD PRESSURE: 122 MMHG | TEMPERATURE: 98 F | WEIGHT: 171 LBS | HEART RATE: 63 BPM | HEIGHT: 65 IN | RESPIRATION RATE: 16 BRPM | DIASTOLIC BLOOD PRESSURE: 64 MMHG

## 2019-02-15 VITALS
BODY MASS INDEX: 29.02 KG/M2 | HEIGHT: 64 IN | OXYGEN SATURATION: 95 % | RESPIRATION RATE: 16 BRPM | HEART RATE: 77 BPM | WEIGHT: 170 LBS | TEMPERATURE: 98 F | SYSTOLIC BLOOD PRESSURE: 146 MMHG | DIASTOLIC BLOOD PRESSURE: 68 MMHG

## 2019-02-15 DIAGNOSIS — R05.9 COUGH: ICD-10-CM

## 2019-02-15 DIAGNOSIS — M79.601 RIGHT ARM PAIN: ICD-10-CM

## 2019-02-15 DIAGNOSIS — E86.0 DEHYDRATION: ICD-10-CM

## 2019-02-15 DIAGNOSIS — R25.2 LEG CRAMPING: ICD-10-CM

## 2019-02-15 DIAGNOSIS — R55 SYNCOPE, NEAR: Primary | ICD-10-CM

## 2019-02-15 DIAGNOSIS — R42 LIGHTHEADEDNESS: Primary | ICD-10-CM

## 2019-02-15 LAB
ALBUMIN SERPL-MCNC: 3.2 G/DL (ref 3.4–5)
ALBUMIN/GLOB SERPL: 0.8 {RATIO} (ref 1–2)
ALP LIVER SERPL-CCNC: 101 U/L (ref 55–142)
ALT SERPL-CCNC: 36 U/L (ref 13–56)
ANION GAP SERPL CALC-SCNC: 4 MMOL/L (ref 0–18)
AST SERPL-CCNC: 25 U/L (ref 15–37)
BASOPHILS # BLD AUTO: 0.08 X10(3) UL (ref 0–0.2)
BASOPHILS NFR BLD AUTO: 1.1 %
BILIRUB SERPL-MCNC: 0.4 MG/DL (ref 0.1–2)
BILIRUB UR QL STRIP.AUTO: NEGATIVE
BUN BLD-MCNC: 21 MG/DL (ref 7–18)
BUN/CREAT SERPL: 19.1 (ref 10–20)
CALCIUM BLD-MCNC: 8.2 MG/DL (ref 8.5–10.1)
CHLORIDE SERPL-SCNC: 107 MMOL/L (ref 98–107)
CLARITY UR REFRACT.AUTO: CLEAR
CO2 SERPL-SCNC: 29 MMOL/L (ref 21–32)
COLOR UR AUTO: YELLOW
CREAT BLD-MCNC: 1.1 MG/DL (ref 0.55–1.02)
DEPRECATED RDW RBC AUTO: 46.5 FL (ref 35.1–46.3)
EOSINOPHIL # BLD AUTO: 0.25 X10(3) UL (ref 0–0.7)
EOSINOPHIL NFR BLD AUTO: 3.4 %
ERYTHROCYTE [DISTWIDTH] IN BLOOD BY AUTOMATED COUNT: 13.2 % (ref 11–15)
GLOBULIN PLAS-MCNC: 4 G/DL (ref 2.8–4.4)
GLUCOSE BLD-MCNC: 96 MG/DL (ref 70–99)
GLUCOSE UR STRIP.AUTO-MCNC: NEGATIVE MG/DL
HCT VFR BLD AUTO: 40 % (ref 35–48)
HGB BLD-MCNC: 12.6 G/DL (ref 12–16)
IMM GRANULOCYTES # BLD AUTO: 0.03 X10(3) UL (ref 0–1)
IMM GRANULOCYTES NFR BLD: 0.4 %
KETONES UR STRIP.AUTO-MCNC: NEGATIVE MG/DL
LYMPHOCYTES # BLD AUTO: 2.28 X10(3) UL (ref 1–4)
LYMPHOCYTES NFR BLD AUTO: 30.6 %
M PROTEIN MFR SERPL ELPH: 7.2 G/DL (ref 6.4–8.2)
MCH RBC QN AUTO: 30.4 PG (ref 26–34)
MCHC RBC AUTO-ENTMCNC: 31.5 G/DL (ref 31–37)
MCV RBC AUTO: 96.6 FL (ref 80–100)
MONOCYTES # BLD AUTO: 0.57 X10(3) UL (ref 0.1–1)
MONOCYTES NFR BLD AUTO: 7.6 %
NEUTROPHILS # BLD AUTO: 4.25 X10 (3) UL (ref 1.5–7.7)
NEUTROPHILS # BLD AUTO: 4.25 X10(3) UL (ref 1.5–7.7)
NEUTROPHILS NFR BLD AUTO: 56.9 %
NITRITE UR QL STRIP.AUTO: NEGATIVE
OSMOLALITY SERPL CALC.SUM OF ELEC: 293 MOSM/KG (ref 275–295)
PH UR STRIP.AUTO: 7 [PH] (ref 4.5–8)
PLATELET # BLD AUTO: 247 10(3)UL (ref 150–450)
POTASSIUM SERPL-SCNC: 4.2 MMOL/L (ref 3.5–5.1)
PROT UR STRIP.AUTO-MCNC: NEGATIVE MG/DL
RBC # BLD AUTO: 4.14 X10(6)UL (ref 3.8–5.3)
RBC UR QL AUTO: NEGATIVE
SODIUM SERPL-SCNC: 140 MMOL/L (ref 136–145)
SP GR UR STRIP.AUTO: 1.01 (ref 1–1.03)
TROPONIN I SERPL-MCNC: <0.045 NG/ML (ref ?–0.04)
UROBILINOGEN UR STRIP.AUTO-MCNC: 0.2 MG/DL
WBC # BLD AUTO: 7.5 X10(3) UL (ref 4–11)
WBC CLUMPS UR QL AUTO: PRESENT

## 2019-02-15 PROCEDURE — 84484 ASSAY OF TROPONIN QUANT: CPT | Performed by: EMERGENCY MEDICINE

## 2019-02-15 PROCEDURE — 93000 ELECTROCARDIOGRAM COMPLETE: CPT | Performed by: PHYSICIAN ASSISTANT

## 2019-02-15 PROCEDURE — 71046 X-RAY EXAM CHEST 2 VIEWS: CPT | Performed by: EMERGENCY MEDICINE

## 2019-02-15 PROCEDURE — 81001 URINALYSIS AUTO W/SCOPE: CPT | Performed by: EMERGENCY MEDICINE

## 2019-02-15 PROCEDURE — 80053 COMPREHEN METABOLIC PANEL: CPT | Performed by: EMERGENCY MEDICINE

## 2019-02-15 PROCEDURE — 87086 URINE CULTURE/COLONY COUNT: CPT | Performed by: EMERGENCY MEDICINE

## 2019-02-15 PROCEDURE — 99213 OFFICE O/P EST LOW 20 MIN: CPT | Performed by: PHYSICIAN ASSISTANT

## 2019-02-15 PROCEDURE — 99284 EMERGENCY DEPT VISIT MOD MDM: CPT | Performed by: EMERGENCY MEDICINE

## 2019-02-15 PROCEDURE — 96360 HYDRATION IV INFUSION INIT: CPT | Performed by: EMERGENCY MEDICINE

## 2019-02-15 PROCEDURE — 85025 COMPLETE CBC W/AUTO DIFF WBC: CPT | Performed by: EMERGENCY MEDICINE

## 2019-02-15 RX ORDER — SODIUM CHLORIDE 9 MG/ML
INJECTION, SOLUTION INTRAVENOUS ONCE
Status: COMPLETED | OUTPATIENT
Start: 2019-02-15 | End: 2019-02-15

## 2019-02-15 NOTE — PROGRESS NOTES
HPI:   Arnoldo Moura is a 78year old female with CAD, hyperlipidemia, OA of hand, OA of ankle and foot, recurrent UTI, hydronephrosis, current episode of major depressive disorder, MI at age 47 who presents for URI symptoms for  6  days.  Patient reports kelly Take 1 tablet (20 mg total) by mouth once daily. Disp: 90 tablet Rfl: 3   Nitrofurantoin Monohyd Macro 100 MG Oral Cap Take 1 capsule (100 mg total) by mouth daily.  Disp: 90 capsule Rfl: 3   Multiple Vitamins-Minerals (MULTIVITAL OR) Take 1 tablet by mouth shoulder and elbow without pain. Sensation intact and symmetric bilateral UEs and LEs. NEURO: CN 2-12 grossly intact, no facial droop, normal speech, strength 5/5 bilateral UEs and LEs, DTRs 2+ bilateral UEs and LEs     ASSESSMENT AND PLAN:   1.  Cecelia Burnham

## 2019-02-16 NOTE — ED INITIAL ASSESSMENT (HPI)
Standing at work 2/14 as , gradual progressive episode of dizziness, weakness. Denies LOC, CP or headache during sx.  Hx of MI 20 years ago

## 2019-02-16 NOTE — ED NOTES
Printed lab/xray results provided to pt at [facility]. Extensive dc teaching discussed w/pt prior to leaving ER.  No additional questions stated by pt or family

## 2019-02-16 NOTE — ED PROVIDER NOTES
Patient Seen in: [facility] Emergency Department In [place]    History   Patient presents with:   Other    Stated Complaint: sent by PCP to have US of cartoid artery and blood work    HPI    59-year-old female presents to the emergency department after Kim Sabillon BIOPSY STEREOTACTIC NODULE 1 SITE RIGHT  2001    neg   • TUBAL LIGATION  1983           Social History    Tobacco Use      Smoking status: Never Smoker      Smokeless tobacco: Never Used    Alcohol use: Yes      Comment: social    Drug use: No      Review Labs Reviewed   COMP METABOLIC PANEL (14) - Abnormal; Notable for the following components:       Result Value    BUN 21 (*)     Creatinine 1.10 (*)     Calcium, Total 8.2 (*)     GFR, Non- 51 (*)     GFR, -American 59 (*)     Al was normal sinus rhythm. Patient was given IV fluids. Her baseline blood work was all normal.  There was no abnormalities on her monitor.   I discussed with her since his episode occurred yesterday and she had been standing for prolonged period of time af

## 2019-02-25 ENCOUNTER — OFFICE VISIT (OUTPATIENT)
Dept: FAMILY MEDICINE CLINIC | Facility: CLINIC | Age: 69
End: 2019-02-25
Payer: COMMERCIAL

## 2019-02-25 VITALS
TEMPERATURE: 98 F | OXYGEN SATURATION: 98 % | DIASTOLIC BLOOD PRESSURE: 68 MMHG | SYSTOLIC BLOOD PRESSURE: 104 MMHG | WEIGHT: 172 LBS | RESPIRATION RATE: 18 BRPM | HEIGHT: 65 IN | BODY MASS INDEX: 28.66 KG/M2 | HEART RATE: 75 BPM

## 2019-02-25 DIAGNOSIS — Z12.39 SCREENING FOR BREAST CANCER: ICD-10-CM

## 2019-02-25 DIAGNOSIS — R09.89 HYPERINFLATION OF LUNGS: ICD-10-CM

## 2019-02-25 DIAGNOSIS — Z00.00 ROUTINE GENERAL MEDICAL EXAMINATION AT A HEALTH CARE FACILITY: Primary | ICD-10-CM

## 2019-02-25 PROCEDURE — 99397 PER PM REEVAL EST PAT 65+ YR: CPT | Performed by: FAMILY MEDICINE

## 2019-02-25 NOTE — PATIENT INSTRUCTIONS
Lifetime risk of Shingles (must have had chicken pox or have been exposed to it) is 30%. Shingrix lowers the risk to 1.5 to 3% and Zostavax lowers it to 6% of ever getting shingles in your lifetime. Shingrix: New vaccine released in 2018.   Prevents s

## 2019-02-25 NOTE — PROGRESS NOTES
HPI:  Here for a physical.  Had a presyncopal event at work. She does a lot of standing. ER doctor thinks she may have simply been dehydrated. I reviewed Michelle's evaluation in the ER note from Dr. Rowan Doing from approximately 2 weeks ago.   She has had no liver   • Other (cirrhosis) Mother    • Other (gallbladder attack) Mother    • Heart Attack Paternal Grandfather    • Heart Disease Father    • Heart Surgery Father    • Other (cholecystectomy) Sister    • Other (multiple sclerosis) Daughter         Vickey King Occasional EtOH      No drugs      REVIEW OF SYSTEMS:  GENERAL: Feeling generally well. EYES: No complaints of diplopia or blurred vision. Some redness to the lateral portion of the orbit. She did have a cold a few weeks ago.   EARS: No complaints of tin OROPHARYNX: Mucosa moist without lesions. There is a crown on the lower incisor with some dark gum along the edge. She has periodontal disease in the upper left and is in need of a. Dentist.  NECK: Supple, No lymphadenopathy.  No thyromegaly or lesions discomfort to the lateral aspect of the left eye. We discussed the trigeminal nerve. No active disease currently.   Patient understood above plan and agreed to do labs within the next 30 days as well as to make all appointments for referrals if given with

## 2019-03-11 ENCOUNTER — HOSPITAL ENCOUNTER (OUTPATIENT)
Dept: GENERAL RADIOLOGY | Age: 69
Discharge: HOME | End: 2019-03-11
Attending: FAMILY MEDICINE
Payer: COMMERCIAL

## 2019-03-11 ENCOUNTER — HOSPITAL ENCOUNTER (OUTPATIENT)
Dept: MAMMOGRAPHY | Age: 69
Discharge: HOME | End: 2019-03-11
Attending: FAMILY MEDICINE
Payer: COMMERCIAL

## 2019-03-11 DIAGNOSIS — R09.89 HYPERINFLATION OF LUNGS: ICD-10-CM

## 2019-03-11 DIAGNOSIS — Z12.39 SCREENING FOR BREAST CANCER: ICD-10-CM

## 2019-03-11 PROCEDURE — 77063 BREAST TOMOSYNTHESIS BI: CPT | Performed by: FAMILY MEDICINE

## 2019-03-11 PROCEDURE — 77067 SCR MAMMO BI INCL CAD: CPT | Performed by: FAMILY MEDICINE

## 2019-03-11 PROCEDURE — 71046 X-RAY EXAM CHEST 2 VIEWS: CPT | Performed by: FAMILY MEDICINE

## 2019-03-26 ENCOUNTER — HOSPITAL ENCOUNTER (OUTPATIENT)
Dept: MAMMOGRAPHY | Facility: HOSPITAL | Age: 69
Discharge: HOME | End: 2019-03-26
Attending: FAMILY MEDICINE
Payer: COMMERCIAL

## 2019-03-26 DIAGNOSIS — R92.2 INCONCLUSIVE MAMMOGRAM: ICD-10-CM

## 2019-03-26 DIAGNOSIS — N63.0 BREAST NODULE: Primary | ICD-10-CM

## 2019-03-26 PROCEDURE — 76642 ULTRASOUND BREAST LIMITED: CPT | Performed by: FAMILY MEDICINE

## 2019-03-26 PROCEDURE — 77061 BREAST TOMOSYNTHESIS UNI: CPT | Performed by: FAMILY MEDICINE

## 2019-03-26 PROCEDURE — 77065 DX MAMMO INCL CAD UNI: CPT | Performed by: FAMILY MEDICINE

## 2019-05-30 DIAGNOSIS — N39.0 RECURRENT UTI (URINARY TRACT INFECTION): ICD-10-CM

## 2019-05-30 RX ORDER — NITROFURANTOIN 25; 75 MG/1; MG/1
CAPSULE ORAL
Qty: 90 CAPSULE | Refills: 2 | Status: SHIPPED | OUTPATIENT
Start: 2019-05-30 | End: 2019-08-16

## 2019-10-04 ENCOUNTER — TELEPHONE (OUTPATIENT)
Dept: FAMILY MEDICINE CLINIC | Facility: CLINIC | Age: 69
End: 2019-10-04

## 2019-10-04 DIAGNOSIS — N63.0 BREAST NODULE: ICD-10-CM

## 2019-10-04 DIAGNOSIS — Z98.890 HX OF BREAST BIOPSY: Primary | ICD-10-CM

## 2019-10-28 ENCOUNTER — HOSPITAL ENCOUNTER (OUTPATIENT)
Dept: MAMMOGRAPHY | Facility: HOSPITAL | Age: 69
Discharge: HOME | End: 2019-10-28
Attending: FAMILY MEDICINE
Payer: COMMERCIAL

## 2019-10-28 DIAGNOSIS — R92.8 ABNORMAL MAMMOGRAM: ICD-10-CM

## 2019-10-28 DIAGNOSIS — Z98.890 HX OF BREAST BIOPSY: ICD-10-CM

## 2019-10-28 DIAGNOSIS — N63.0 BREAST NODULE: ICD-10-CM

## 2019-10-28 PROCEDURE — 76642 ULTRASOUND BREAST LIMITED: CPT | Performed by: FAMILY MEDICINE

## 2019-10-28 PROCEDURE — 77061 BREAST TOMOSYNTHESIS UNI: CPT | Performed by: FAMILY MEDICINE

## 2019-10-28 PROCEDURE — 77065 DX MAMMO INCL CAD UNI: CPT | Performed by: FAMILY MEDICINE

## 2020-01-06 ENCOUNTER — HOSPITAL ENCOUNTER (OUTPATIENT)
Dept: GENERAL RADIOLOGY | Age: 70
Discharge: HOME | End: 2020-01-06
Attending: FAMILY MEDICINE
Payer: COMMERCIAL

## 2020-01-06 ENCOUNTER — LAB ENCOUNTER (OUTPATIENT)
Dept: LAB | Age: 70
End: 2020-01-06
Attending: FAMILY MEDICINE
Payer: COMMERCIAL

## 2020-01-06 ENCOUNTER — OFFICE VISIT (OUTPATIENT)
Dept: FAMILY MEDICINE CLINIC | Facility: CLINIC | Age: 70
End: 2020-01-06
Payer: COMMERCIAL

## 2020-01-06 VITALS
WEIGHT: 170 LBS | DIASTOLIC BLOOD PRESSURE: 68 MMHG | BODY MASS INDEX: 29.38 KG/M2 | SYSTOLIC BLOOD PRESSURE: 102 MMHG | HEART RATE: 73 BPM | HEIGHT: 63.78 IN | OXYGEN SATURATION: 98 % | RESPIRATION RATE: 18 BRPM

## 2020-01-06 DIAGNOSIS — M19.041 PRIMARY LOCALIZED OSTEOARTHROSIS OF RIGHT HAND: ICD-10-CM

## 2020-01-06 DIAGNOSIS — E78.00 PURE HYPERCHOLESTEROLEMIA: ICD-10-CM

## 2020-01-06 DIAGNOSIS — Z12.39 SCREENING FOR BREAST CANCER: ICD-10-CM

## 2020-01-06 DIAGNOSIS — Z00.00 ROUTINE GENERAL MEDICAL EXAMINATION AT A HEALTH CARE FACILITY: Primary | ICD-10-CM

## 2020-01-06 DIAGNOSIS — R25.2 MUSCLE CRAMPS AT NIGHT: ICD-10-CM

## 2020-01-06 PROBLEM — F32.1 CURRENT MODERATE EPISODE OF MAJOR DEPRESSIVE DISORDER WITHOUT PRIOR EPISODE (HCC): Status: RESOLVED | Noted: 2018-04-23 | Resolved: 2020-01-06

## 2020-01-06 LAB
ALBUMIN SERPL-MCNC: 3.3 G/DL (ref 3.4–5)
ALBUMIN/GLOB SERPL: 0.8 {RATIO} (ref 1–2)
ALP LIVER SERPL-CCNC: 112 U/L (ref 55–142)
ALT SERPL-CCNC: 67 U/L (ref 13–56)
ANION GAP SERPL CALC-SCNC: 5 MMOL/L (ref 0–18)
AST SERPL-CCNC: 47 U/L (ref 15–37)
BASOPHILS # BLD AUTO: 0.1 X10(3) UL (ref 0–0.2)
BASOPHILS NFR BLD AUTO: 1.3 %
BILIRUB SERPL-MCNC: 0.7 MG/DL (ref 0.1–2)
BUN BLD-MCNC: 20 MG/DL (ref 7–18)
BUN/CREAT SERPL: 18.9 (ref 10–20)
CALCIUM BLD-MCNC: 8.9 MG/DL (ref 8.5–10.1)
CHLORIDE SERPL-SCNC: 110 MMOL/L (ref 98–112)
CHOLEST SMN-MCNC: 133 MG/DL (ref ?–200)
CO2 SERPL-SCNC: 27 MMOL/L (ref 21–32)
CREAT BLD-MCNC: 1.06 MG/DL (ref 0.55–1.02)
DEPRECATED RDW RBC AUTO: 47.7 FL (ref 35.1–46.3)
EOSINOPHIL # BLD AUTO: 0.36 X10(3) UL (ref 0–0.7)
EOSINOPHIL NFR BLD AUTO: 4.7 %
ERYTHROCYTE [DISTWIDTH] IN BLOOD BY AUTOMATED COUNT: 13 % (ref 11–15)
GLOBULIN PLAS-MCNC: 4.4 G/DL (ref 2.8–4.4)
GLUCOSE BLD-MCNC: 98 MG/DL (ref 70–99)
HCT VFR BLD AUTO: 43.7 % (ref 35–48)
HDLC SERPL-MCNC: 55 MG/DL (ref 40–59)
HGB BLD-MCNC: 13.3 G/DL (ref 12–16)
IMM GRANULOCYTES # BLD AUTO: 0.02 X10(3) UL (ref 0–1)
IMM GRANULOCYTES NFR BLD: 0.3 %
LDLC SERPL CALC-MCNC: 58 MG/DL (ref ?–100)
LYMPHOCYTES # BLD AUTO: 2.18 X10(3) UL (ref 1–4)
LYMPHOCYTES NFR BLD AUTO: 28.6 %
M PROTEIN MFR SERPL ELPH: 7.7 G/DL (ref 6.4–8.2)
MCH RBC QN AUTO: 30.1 PG (ref 26–34)
MCHC RBC AUTO-ENTMCNC: 30.4 G/DL (ref 31–37)
MCV RBC AUTO: 98.9 FL (ref 80–100)
MONOCYTES # BLD AUTO: 0.76 X10(3) UL (ref 0.1–1)
MONOCYTES NFR BLD AUTO: 10 %
NEUTROPHILS # BLD AUTO: 4.19 X10 (3) UL (ref 1.5–7.7)
NEUTROPHILS # BLD AUTO: 4.19 X10(3) UL (ref 1.5–7.7)
NEUTROPHILS NFR BLD AUTO: 55.1 %
NONHDLC SERPL-MCNC: 78 MG/DL (ref ?–130)
OSMOLALITY SERPL CALC.SUM OF ELEC: 297 MOSM/KG (ref 275–295)
PATIENT FASTING Y/N/NP: YES
PATIENT FASTING Y/N/NP: YES
PLATELET # BLD AUTO: 231 10(3)UL (ref 150–450)
POTASSIUM SERPL-SCNC: 4.6 MMOL/L (ref 3.5–5.1)
RBC # BLD AUTO: 4.42 X10(6)UL (ref 3.8–5.3)
SODIUM SERPL-SCNC: 142 MMOL/L (ref 136–145)
T4 FREE SERPL-MCNC: 1 NG/DL (ref 0.8–1.7)
TRIGL SERPL-MCNC: 101 MG/DL (ref 30–149)
TSI SER-ACNC: 1.77 MIU/ML (ref 0.36–3.74)
VIT B12 SERPL-MCNC: 1404 PG/ML (ref 193–986)
VIT D+METAB SERPL-MCNC: 64.2 NG/ML (ref 30–100)
VLDLC SERPL CALC-MCNC: 20 MG/DL (ref 0–30)
WBC # BLD AUTO: 7.6 X10(3) UL (ref 4–11)

## 2020-01-06 PROCEDURE — 82306 VITAMIN D 25 HYDROXY: CPT | Performed by: FAMILY MEDICINE

## 2020-01-06 PROCEDURE — 36415 COLL VENOUS BLD VENIPUNCTURE: CPT | Performed by: FAMILY MEDICINE

## 2020-01-06 PROCEDURE — 82607 VITAMIN B-12: CPT | Performed by: FAMILY MEDICINE

## 2020-01-06 PROCEDURE — 84439 ASSAY OF FREE THYROXINE: CPT | Performed by: FAMILY MEDICINE

## 2020-01-06 PROCEDURE — 80050 GENERAL HEALTH PANEL: CPT | Performed by: FAMILY MEDICINE

## 2020-01-06 PROCEDURE — 99397 PER PM REEVAL EST PAT 65+ YR: CPT | Performed by: FAMILY MEDICINE

## 2020-01-06 PROCEDURE — 80061 LIPID PANEL: CPT | Performed by: FAMILY MEDICINE

## 2020-01-06 PROCEDURE — 73130 X-RAY EXAM OF HAND: CPT | Performed by: FAMILY MEDICINE

## 2020-01-06 NOTE — PROGRESS NOTES
HPI:  Here for a physical.    PAST MEDICAL HISTORY:  Past Medical History:   Diagnosis Date   • Allergic rhinitis    • Cholelithiasis 10/21/2016   • Current moderate episode of major depressive disorder without [facility] episode [facility] 4/23/[facility]   • Heart attac file      Number of children: Not on file      Years of education: Not on file      Highest education level: Not on file    Occupational History      Not on file    Social Needs      Financial resource strain: Not on file      Food insecurity:        Worry shortness of breath with activity. No complaints of  wheezing. No complaints of hemoptysis. GASTROINTESTINAL:No complaints of dysphgia or any GERD symptoms. Denies hematochezia and melena. No complaints of  any new constipation or diarrhea.  No complaints dullness to percussion. ABDOMEN: Soft, nontender, nondistended, NABS x 4 quadrants. No HSM; no masses; no bruits.   LYMPHATIC: no lymphadenopathy palpated about the anterior and posterior cervical chains, submandibular and supraclavicular areas, axilla, an

## 2020-01-08 ENCOUNTER — TELEPHONE (OUTPATIENT)
Dept: FAMILY MEDICINE CLINIC | Facility: CLINIC | Age: 70
End: 2020-01-08

## 2020-01-08 DIAGNOSIS — E53.8 VITAMIN B 12 DEFICIENCY: ICD-10-CM

## 2020-01-08 DIAGNOSIS — E78.5 DYSLIPIDEMIA: ICD-10-CM

## 2020-01-08 DIAGNOSIS — E78.00 PURE HYPERCHOLESTEROLEMIA: Primary | ICD-10-CM

## 2020-01-08 DIAGNOSIS — K76.9 LIVER DISEASE: ICD-10-CM

## 2020-01-08 NOTE — TELEPHONE ENCOUNTER
----- Message from Loulou Payan MD sent at 1/7/2020 10:12 AM CST -----  Please call patient. Vitamin B12 too high. Stop vitamin B12 supplements. Renal function stable to slightly improved from last year.   AST and ALT reflect liver function and are mi

## 2020-01-08 NOTE — TELEPHONE ENCOUNTER
PC to pt . LMTCB for lab test results and Dr. Gibbons Doing recommendations.    Epic orders placed for f/.u labs

## 2020-01-09 NOTE — TELEPHONE ENCOUNTER
Copied from result letter under \"letter\" tab. FINDINGS:  Severe osteoarthritis at the right 1st carpometacarpal joint. 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 No acute displaced osseous fracture is seen.   Moderate osteoarthritis wi

## 2020-03-02 ENCOUNTER — TELEPHONE (OUTPATIENT)
Dept: FAMILY MEDICINE CLINIC | Facility: CLINIC | Age: 70
End: 2020-03-02

## 2020-03-02 NOTE — TELEPHONE ENCOUNTER
Per 10/4/19 results \"RECOMMENDATIONS:    RETURN TO ANNUAL SCREENING MAMMMOGRAM IN SIX MONTHS.  BILATERAL BREASTS  \"  TCB

## 2020-03-17 ENCOUNTER — HOSPITAL ENCOUNTER (OUTPATIENT)
Dept: MAMMOGRAPHY | Age: 70
Discharge: HOME | End: 2020-03-17
Attending: FAMILY MEDICINE
Payer: COMMERCIAL

## 2020-03-17 DIAGNOSIS — Z12.39 SCREENING FOR BREAST CANCER: ICD-10-CM

## 2020-03-17 PROCEDURE — 77067 SCR MAMMO BI INCL CAD: CPT | Performed by: FAMILY MEDICINE

## 2020-03-17 PROCEDURE — 77063 BREAST TOMOSYNTHESIS BI: CPT | Performed by: FAMILY MEDICINE

## 2020-04-28 ENCOUNTER — APPOINTMENT (OUTPATIENT)
Dept: LAB | Age: 70
End: 2020-04-28
Attending: FAMILY MEDICINE
Payer: COMMERCIAL

## 2020-04-28 ENCOUNTER — TELEPHONE (OUTPATIENT)
Dept: FAMILY MEDICINE CLINIC | Facility: CLINIC | Age: 70
End: 2020-04-28

## 2020-04-28 DIAGNOSIS — E53.8 VITAMIN B 12 DEFICIENCY: ICD-10-CM

## 2020-04-28 DIAGNOSIS — K76.9 LIVER DISEASE: ICD-10-CM

## 2020-04-28 DIAGNOSIS — E78.5 DYSLIPIDEMIA: ICD-10-CM

## 2020-04-28 PROCEDURE — 84460 ALANINE AMINO (ALT) (SGPT): CPT | Performed by: FAMILY MEDICINE

## 2020-04-28 PROCEDURE — 82607 VITAMIN B-12: CPT | Performed by: FAMILY MEDICINE

## 2020-04-28 PROCEDURE — 80061 LIPID PANEL: CPT | Performed by: FAMILY MEDICINE

## 2020-04-28 PROCEDURE — 84450 TRANSFERASE (AST) (SGOT): CPT | Performed by: FAMILY MEDICINE

## 2020-04-28 PROCEDURE — 36415 COLL VENOUS BLD VENIPUNCTURE: CPT | Performed by: FAMILY MEDICINE

## 2020-04-28 NOTE — TELEPHONE ENCOUNTER
I spoke to the patient regarding her lab results during the coronavirus pandemic. Her cholesterol looks great with a small increase in HDL and a small increase in LDL but still very good. Liver functions of come down nicely.   B12 level is come down cecelia

## 2020-07-06 ENCOUNTER — OFFICE VISIT (OUTPATIENT)
Dept: FAMILY MEDICINE CLINIC | Facility: CLINIC | Age: 70
End: 2020-07-06
Payer: COMMERCIAL

## 2020-07-06 VITALS
RESPIRATION RATE: 18 BRPM | OXYGEN SATURATION: 98 % | WEIGHT: 169 LBS | HEIGHT: 65 IN | SYSTOLIC BLOOD PRESSURE: 112 MMHG | HEART RATE: 82 BPM | BODY MASS INDEX: 28.16 KG/M2 | DIASTOLIC BLOOD PRESSURE: 60 MMHG

## 2020-07-06 DIAGNOSIS — E78.00 PURE HYPERCHOLESTEROLEMIA: Primary | ICD-10-CM

## 2020-07-06 DIAGNOSIS — N39.0 RECURRENT UTI (URINARY TRACT INFECTION): ICD-10-CM

## 2020-07-06 DIAGNOSIS — I25.10 CORONARY ARTERY DISEASE INVOLVING NATIVE CORONARY ARTERY OF NATIVE HEART WITHOUT ANGINA PECTORIS: ICD-10-CM

## 2020-07-06 PROCEDURE — 99213 OFFICE O/P EST LOW 20 MIN: CPT | Performed by: FAMILY MEDICINE

## 2020-07-06 RX ORDER — NITROFURANTOIN 25; 75 MG/1; MG/1
100 CAPSULE ORAL
Qty: 90 CAPSULE | Refills: 3 | Status: SHIPPED | OUTPATIENT
Start: 2020-07-06 | End: 2021-10-06

## 2020-07-06 RX ORDER — ATORVASTATIN CALCIUM 20 MG/1
20 TABLET, FILM COATED ORAL DAILY
Qty: 90 TABLET | Refills: 3 | Status: SHIPPED | OUTPATIENT
Start: 2020-07-06 | End: 2021-09-03

## 2020-07-06 NOTE — PROGRESS NOTES
Here to follow-up on hyperlipidemia. Has had labs done in January and April. History of coronary artery disease. Sees Dr. Alisa Astudillo from cardiology. Is on baby aspirin. Has had a couple episodes of vertigo. Last just a fleeting moment.   Was recent Antibiotics  FAMILY HISTORY  Family History   Problem Relation Age of Onset   • Cancer Mother         liver   • Vitiligo Mother    • Other (cirrhosis) Mother    • Other (gallbladder attack) Mother    • Heart Attack Paternal Grandfather    • Heart Disease F

## 2020-07-28 PROBLEM — N81.11 CYSTOCELE, MIDLINE: Status: ACTIVE | Noted: 2020-07-28

## 2021-01-25 ENCOUNTER — OFFICE VISIT (OUTPATIENT)
Dept: FAMILY MEDICINE CLINIC | Facility: CLINIC | Age: 71
End: 2021-01-25
Payer: COMMERCIAL

## 2021-01-25 ENCOUNTER — LAB ENCOUNTER (OUTPATIENT)
Dept: LAB | Age: 71
End: 2021-01-25
Attending: FAMILY MEDICINE
Payer: COMMERCIAL

## 2021-01-25 VITALS
OXYGEN SATURATION: 99 % | RESPIRATION RATE: 16 BRPM | HEART RATE: 70 BPM | HEIGHT: 63.6 IN | SYSTOLIC BLOOD PRESSURE: 100 MMHG | BODY MASS INDEX: 27.83 KG/M2 | DIASTOLIC BLOOD PRESSURE: 62 MMHG | WEIGHT: 161 LBS

## 2021-01-25 DIAGNOSIS — Z00.00 ROUTINE GENERAL MEDICAL EXAMINATION AT A HEALTH CARE FACILITY: ICD-10-CM

## 2021-01-25 DIAGNOSIS — I25.10 CORONARY ARTERY DISEASE INVOLVING NATIVE CORONARY ARTERY OF NATIVE HEART WITHOUT ANGINA PECTORIS: ICD-10-CM

## 2021-01-25 DIAGNOSIS — E78.00 PURE HYPERCHOLESTEROLEMIA: ICD-10-CM

## 2021-01-25 DIAGNOSIS — N39.0 RECURRENT UTI (URINARY TRACT INFECTION): ICD-10-CM

## 2021-01-25 DIAGNOSIS — Z00.00 ROUTINE GENERAL MEDICAL EXAMINATION AT A HEALTH CARE FACILITY: Primary | ICD-10-CM

## 2021-01-25 DIAGNOSIS — Z12.31 ENCOUNTER FOR SCREENING MAMMOGRAM FOR MALIGNANT NEOPLASM OF BREAST: ICD-10-CM

## 2021-01-25 LAB
ALBUMIN SERPL-MCNC: 3.4 G/DL (ref 3.4–5)
ALBUMIN/GLOB SERPL: 0.8 {RATIO} (ref 1–2)
ALP LIVER SERPL-CCNC: 99 U/L
ALT SERPL-CCNC: 53 U/L
ANION GAP SERPL CALC-SCNC: 2 MMOL/L (ref 0–18)
AST SERPL-CCNC: 33 U/L (ref 15–37)
BILIRUB SERPL-MCNC: 0.7 MG/DL (ref 0.1–2)
BUN BLD-MCNC: 22 MG/DL (ref 7–18)
BUN/CREAT SERPL: 17.5 (ref 10–20)
CALCIUM BLD-MCNC: 9.3 MG/DL (ref 8.5–10.1)
CHLORIDE SERPL-SCNC: 112 MMOL/L (ref 98–112)
CHOLEST SMN-MCNC: 122 MG/DL (ref ?–200)
CO2 SERPL-SCNC: 27 MMOL/L (ref 21–32)
CREAT BLD-MCNC: 1.26 MG/DL
GLOBULIN PLAS-MCNC: 4.2 G/DL (ref 2.8–4.4)
GLUCOSE BLD-MCNC: 91 MG/DL (ref 70–99)
HDLC SERPL-MCNC: 56 MG/DL (ref 40–59)
LDLC SERPL CALC-MCNC: 51 MG/DL (ref ?–100)
M PROTEIN MFR SERPL ELPH: 7.6 G/DL (ref 6.4–8.2)
NONHDLC SERPL-MCNC: 66 MG/DL (ref ?–130)
OSMOLALITY SERPL CALC.SUM OF ELEC: 295 MOSM/KG (ref 275–295)
PATIENT FASTING Y/N/NP: YES
PATIENT FASTING Y/N/NP: YES
POTASSIUM SERPL-SCNC: 5.5 MMOL/L (ref 3.5–5.1)
SODIUM SERPL-SCNC: 141 MMOL/L (ref 136–145)
TRIGL SERPL-MCNC: 74 MG/DL (ref 30–149)
VLDLC SERPL CALC-MCNC: 15 MG/DL (ref 0–30)

## 2021-01-25 PROCEDURE — 96127 BRIEF EMOTIONAL/BEHAV ASSMT: CPT | Performed by: FAMILY MEDICINE

## 2021-01-25 PROCEDURE — 36415 COLL VENOUS BLD VENIPUNCTURE: CPT

## 2021-01-25 PROCEDURE — 3078F DIAST BP <80 MM HG: CPT | Performed by: FAMILY MEDICINE

## 2021-01-25 PROCEDURE — 80061 LIPID PANEL: CPT

## 2021-01-25 PROCEDURE — 3008F BODY MASS INDEX DOCD: CPT | Performed by: FAMILY MEDICINE

## 2021-01-25 PROCEDURE — 80053 COMPREHEN METABOLIC PANEL: CPT

## 2021-01-25 PROCEDURE — 3074F SYST BP LT 130 MM HG: CPT | Performed by: FAMILY MEDICINE

## 2021-01-25 PROCEDURE — 99397 PER PM REEVAL EST PAT 65+ YR: CPT | Performed by: FAMILY MEDICINE

## 2021-01-25 NOTE — PROGRESS NOTES
HPI:  Here for a physical.    PAST MEDICAL HISTORY:  Past Medical History:   Diagnosis Date   • Allergic rhinitis    • Cholelithiasis 10/21/2016   • Current moderate [facility] of major [facility]   • Heart attac Marital status:       Spouse name: Not on file      Number of children: Not on file      Years of education: Not on file      Highest education level: Not on file    Occupational History      Not on file    Social Needs      Financial resource strai Gets a chest pressure when she is anxious about her  who is been falling. He is currently in the hospital.  Never exertional.  PULMONARY: No complaints of  extreme shortness of breath with activity. No complaints of  wheezing.  No complaints of hemo no lymphadenopathy palpated about the anterior and posterior cervical chains, submandibular and supraclavicular areas, axilla, and inguinal areas. EXTREMITIES / MUSCULOSKELETAL: 4 extremities with grossly normal ROM. Gait NL.   No cyanosis, clubbing or jose alfredo

## 2021-01-26 DIAGNOSIS — E87.5 HYPERKALEMIA: Primary | ICD-10-CM

## 2021-03-08 DIAGNOSIS — Z23 NEED FOR VACCINATION: ICD-10-CM

## 2021-03-18 ENCOUNTER — HOSPITAL ENCOUNTER (OUTPATIENT)
Dept: MAMMOGRAPHY | Age: 71
Discharge: HOME | End: 2021-03-18
Attending: FAMILY MEDICINE
Payer: COMMERCIAL

## 2021-03-18 DIAGNOSIS — Z12.31 ENCOUNTER FOR SCREENING MAMMOGRAM FOR MALIGNANT NEOPLASM OF BREAST: ICD-10-CM

## 2021-03-18 PROCEDURE — 77067 SCR MAMMO BI INCL CAD: CPT | Performed by: FAMILY MEDICINE

## 2021-03-18 PROCEDURE — 77063 BREAST TOMOSYNTHESIS BI: CPT | Performed by: FAMILY MEDICINE

## 2021-04-16 ENCOUNTER — LAB ENCOUNTER (OUTPATIENT)
Dept: LAB | Facility: HOSPITAL | Age: 71
End: 2021-04-16
Attending: INTERNAL MEDICINE
Payer: COMMERCIAL

## 2021-04-16 DIAGNOSIS — Z01.818 PRE-OP TESTING: ICD-10-CM

## 2021-04-19 PROBLEM — Z86.010 HISTORY OF ADENOMATOUS POLYP OF COLON: Status: ACTIVE | Noted: 2021-04-19

## 2021-04-19 PROBLEM — Z86.0101 HISTORY OF ADENOMATOUS POLYP OF COLON: Status: ACTIVE | Noted: 2021-04-19

## 2021-04-20 ENCOUNTER — OFFICE VISIT (OUTPATIENT)
Dept: FAMILY MEDICINE CLINIC | Facility: CLINIC | Age: 71
End: 2021-04-20
Payer: COMMERCIAL

## 2021-04-20 VITALS
HEIGHT: 63.6 IN | SYSTOLIC BLOOD PRESSURE: 124 MMHG | BODY MASS INDEX: 28.35 KG/M2 | DIASTOLIC BLOOD PRESSURE: 74 MMHG | RESPIRATION RATE: 16 BRPM | HEART RATE: 97 BPM | WEIGHT: 164 LBS | OXYGEN SATURATION: 97 %

## 2021-04-20 DIAGNOSIS — R42 EPISODIC LIGHTHEADEDNESS: Primary | ICD-10-CM

## 2021-04-20 DIAGNOSIS — L65.9 ALOPECIA: ICD-10-CM

## 2021-04-20 DIAGNOSIS — E87.5 HYPERKALEMIA: ICD-10-CM

## 2021-04-20 DIAGNOSIS — R68.81 EARLY SATIETY: ICD-10-CM

## 2021-04-20 PROCEDURE — 3074F SYST BP LT 130 MM HG: CPT | Performed by: FAMILY MEDICINE

## 2021-04-20 PROCEDURE — 99214 OFFICE O/P EST MOD 30 MIN: CPT | Performed by: FAMILY MEDICINE

## 2021-04-20 PROCEDURE — 3008F BODY MASS INDEX DOCD: CPT | Performed by: FAMILY MEDICINE

## 2021-04-20 PROCEDURE — 3078F DIAST BP <80 MM HG: CPT | Performed by: FAMILY MEDICINE

## 2021-04-20 NOTE — PROGRESS NOTES
In the last month has had 4-5 episodes of dizziness described as lightheadedness. Most recent episode lasted over an hour and made her leave work. She ended up calling in the next day because she still did not feel great.   Previous episodes did not last Sleep disturbance    • Uncomfortable fullness after meals    • Wears glasses      PAST SURGICAL HISTORY:  Past Surgical History:   Procedure Laterality Date   • ANGIOGRAM      no stents   • APPENDECTOMY  2005   • CATARACT  2016   • COLONOSCOPY  1/17/2011 react to light. Extraocular muscles intact. Anicteric sclera and pink conjunctiva. Mouth moist clear no erythema. Neck normal carotid upstroke and volume without bruit. No lymphadenopathy. Heart regular rhythm normal S1-S2 no murmurs.   Abdomen soft s to face discussion and examination, and the time documenting the visit. It may also include time ordering tests or reviewing test results completed on the same day.

## 2021-04-26 ENCOUNTER — LAB ENCOUNTER (OUTPATIENT)
Dept: LAB | Age: 71
End: 2021-04-26
Attending: FAMILY MEDICINE
Payer: COMMERCIAL

## 2021-04-26 DIAGNOSIS — E87.5 HYPERKALEMIA: ICD-10-CM

## 2021-04-26 DIAGNOSIS — R68.81 EARLY SATIETY: Primary | ICD-10-CM

## 2021-04-26 DIAGNOSIS — R68.81 EARLY SATIETY: ICD-10-CM

## 2021-04-26 DIAGNOSIS — R79.82 ELEVATED C-REACTIVE PROTEIN (CRP): ICD-10-CM

## 2021-04-26 DIAGNOSIS — R74.01 ELEVATED ALT MEASUREMENT: ICD-10-CM

## 2021-04-26 DIAGNOSIS — R74.01 ELEVATED AST (SGOT): ICD-10-CM

## 2021-04-26 DIAGNOSIS — L65.9 ALOPECIA: ICD-10-CM

## 2021-04-26 DIAGNOSIS — R42 EPISODIC LIGHTHEADEDNESS: ICD-10-CM

## 2021-04-26 PROCEDURE — 86140 C-REACTIVE PROTEIN: CPT | Performed by: FAMILY MEDICINE

## 2021-04-26 PROCEDURE — 83516 IMMUNOASSAY NONANTIBODY: CPT | Performed by: FAMILY MEDICINE

## 2021-04-26 PROCEDURE — 80050 GENERAL HEALTH PANEL: CPT | Performed by: FAMILY MEDICINE

## 2021-04-26 PROCEDURE — 82607 VITAMIN B-12: CPT | Performed by: FAMILY MEDICINE

## 2021-05-17 ENCOUNTER — HOSPITAL ENCOUNTER (OUTPATIENT)
Dept: ULTRASOUND IMAGING | Age: 71
Discharge: HOME | End: 2021-05-17
Attending: FAMILY MEDICINE
Payer: COMMERCIAL

## 2021-05-17 DIAGNOSIS — R79.82 ELEVATED C-REACTIVE PROTEIN (CRP): ICD-10-CM

## 2021-05-17 DIAGNOSIS — R68.81 EARLY SATIETY: ICD-10-CM

## 2021-05-17 DIAGNOSIS — R74.01 ELEVATED AST (SGOT): ICD-10-CM

## 2021-05-17 DIAGNOSIS — R74.01 ELEVATED ALT MEASUREMENT: ICD-10-CM

## 2021-05-17 PROCEDURE — 76700 US EXAM ABDOM COMPLETE: CPT | Performed by: FAMILY MEDICINE

## 2021-06-02 ENCOUNTER — TELEPHONE (OUTPATIENT)
Dept: FAMILY MEDICINE CLINIC | Facility: CLINIC | Age: 71
End: 2021-06-02

## 2021-06-02 DIAGNOSIS — R19.8 ABNORMAL BOWEL MOVEMENT: Primary | ICD-10-CM

## 2021-06-09 NOTE — TELEPHONE ENCOUNTER
Patient notified, verbalized understanding, patient aware to await authorization prior to having the imaging.

## 2021-06-09 NOTE — TELEPHONE ENCOUNTER
Could be irritable bowel syndrome. Given her lack of findings on ultrasound proceed with CT abdomen and pelvis without contrast to assess for any space-occupying lesions in the abdomen.

## 2021-06-09 NOTE — TELEPHONE ENCOUNTER
Liver looks perfect.  Kidneys mildly swollen but improved from previous ultrasound 5 years ago. Nobie Inoue me know if the feeling of fullness persists.    Written by Caesar Loera MD on 5/17/2021 11:44 AM CDT  Not seen    Patient called back, verbalized unders

## 2021-06-15 ENCOUNTER — HOSPITAL ENCOUNTER (OUTPATIENT)
Dept: GENERAL RADIOLOGY | Age: 71
Discharge: HOME | End: 2021-06-15
Attending: FAMILY MEDICINE
Payer: COMMERCIAL

## 2021-06-15 ENCOUNTER — OFFICE VISIT (OUTPATIENT)
Dept: FAMILY MEDICINE CLINIC | Facility: CLINIC | Age: 71
End: 2021-06-15
Payer: COMMERCIAL

## 2021-06-15 VITALS
RESPIRATION RATE: 18 BRPM | DIASTOLIC BLOOD PRESSURE: 60 MMHG | HEART RATE: 81 BPM | SYSTOLIC BLOOD PRESSURE: 116 MMHG | OXYGEN SATURATION: 98 %

## 2021-06-15 DIAGNOSIS — S80.02XA CONTUSION OF LEFT KNEE, INITIAL ENCOUNTER: ICD-10-CM

## 2021-06-15 DIAGNOSIS — R19.4 ALTERED BOWEL HABITS: ICD-10-CM

## 2021-06-15 DIAGNOSIS — S40.021A CONTUSION, SHOULDER AND UPPER ARM, MULTIPLE SITES, RIGHT, INITIAL ENCOUNTER: ICD-10-CM

## 2021-06-15 DIAGNOSIS — S40.011A CONTUSION, SHOULDER AND UPPER ARM, MULTIPLE SITES, RIGHT, INITIAL ENCOUNTER: ICD-10-CM

## 2021-06-15 DIAGNOSIS — L91.8 SKIN TAGS, MULTIPLE ACQUIRED: ICD-10-CM

## 2021-06-15 DIAGNOSIS — S09.93XA FACIAL TRAUMA, INITIAL ENCOUNTER: ICD-10-CM

## 2021-06-15 DIAGNOSIS — R22.31 LOCALIZED SWELLING ON RIGHT HAND: ICD-10-CM

## 2021-06-15 DIAGNOSIS — R22.31 LOCALIZED SWELLING ON RIGHT HAND: Primary | ICD-10-CM

## 2021-06-15 PROCEDURE — 3078F DIAST BP <80 MM HG: CPT | Performed by: FAMILY MEDICINE

## 2021-06-15 PROCEDURE — 3074F SYST BP LT 130 MM HG: CPT | Performed by: FAMILY MEDICINE

## 2021-06-15 PROCEDURE — 99214 OFFICE O/P EST MOD 30 MIN: CPT | Performed by: FAMILY MEDICINE

## 2021-06-15 PROCEDURE — 73130 X-RAY EXAM OF HAND: CPT | Performed by: FAMILY MEDICINE

## 2021-06-15 PROCEDURE — 70150 X-RAY EXAM OF FACIAL BONES: CPT | Performed by: FAMILY MEDICINE

## 2021-06-15 NOTE — PROGRESS NOTES
Was camping yesterday near [facility]. Tripped over a tent stake and landed to crashing into a lawn chair injuring the right side of her face at the cheekbone as well as the right shoulder and right hand. She noted some bruising on the knees today.   The mo Recon Soln Take as directed by physician. 4000 mL 0   • atorvastatin 20 MG Oral Tab Take 1 tablet (20 mg total) by mouth daily. 90 tablet 3   • Nitrofurantoin Monohyd Macro 100 MG Oral Cap Take 1 capsule (100 mg total) by mouth once daily.  90 capsule 3   • neck.    Ultrasound of the abdomen from June 1 reviewed. Absence of gallbladder. Normal liver and bile ducts. Hydronephrosis of kidneys improved from 2015. No other atypia. ASSESSMENT/PLAN:    1.  Localized swelling on right hand  Given the trauma conc

## 2021-10-06 ENCOUNTER — TELEPHONE (OUTPATIENT)
Dept: FAMILY MEDICINE CLINIC | Facility: CLINIC | Age: 71
End: 2021-10-06

## 2021-10-06 DIAGNOSIS — N39.0 RECURRENT UTI (URINARY TRACT INFECTION): ICD-10-CM

## 2021-10-06 RX ORDER — NITROFURANTOIN 25; 75 MG/1; MG/1
CAPSULE ORAL
Qty: 90 CAPSULE | Refills: 0 | Status: SHIPPED | OUTPATIENT
Start: 2021-10-06 | End: 2022-01-26

## 2021-10-06 NOTE — TELEPHONE ENCOUNTER
Dr. Trev Baltazar ordered a CT abdomen in . Pt just sched her test for this Monday 10/11, and was advised it would need to be re-authorized by insurance as the authorization has .

## 2021-10-11 ENCOUNTER — HOSPITAL ENCOUNTER (OUTPATIENT)
Dept: CT IMAGING | Facility: HOSPITAL | Age: 71
Discharge: HOME | End: 2021-10-11
Attending: FAMILY MEDICINE
Payer: COMMERCIAL

## 2021-10-11 DIAGNOSIS — R19.8 ABNORMAL BOWEL MOVEMENT: ICD-10-CM

## 2021-10-11 PROCEDURE — 74176 CT ABD & PELVIS W/O CONTRAST: CPT | Performed by: FAMILY MEDICINE

## 2021-10-22 ENCOUNTER — TELEPHONE (OUTPATIENT)
Dept: FAMILY MEDICINE CLINIC | Facility: CLINIC | Age: 71
End: 2021-10-22

## 2021-10-22 NOTE — TELEPHONE ENCOUNTER
Patient was calling to find out CT results. She currently was having issues with MyChart. Please advise.

## 2021-10-22 NOTE — TELEPHONE ENCOUNTER
Swelling of the kidney has reduced but is not quite normal yet.  2 cysts are found, 1 in the kidney and 1 in the pelvis.  They list several benign possibilities for the pelvic cyst.  As you are having no discomfort in the pelvis, I would not recommend furt

## 2022-01-26 ENCOUNTER — LAB ENCOUNTER (OUTPATIENT)
Dept: LAB | Age: 72
End: 2022-01-26
Attending: FAMILY MEDICINE
Payer: COMMERCIAL

## 2022-01-26 ENCOUNTER — HOSPITAL ENCOUNTER (OUTPATIENT)
Dept: GENERAL RADIOLOGY | Age: 72
Discharge: HOME | End: 2022-01-26
Attending: FAMILY MEDICINE
Payer: COMMERCIAL

## 2022-01-26 ENCOUNTER — OFFICE VISIT (OUTPATIENT)
Dept: FAMILY MEDICINE CLINIC | Facility: CLINIC | Age: 72
End: 2022-01-26
Payer: COMMERCIAL

## 2022-01-26 VITALS
HEIGHT: 63 IN | HEART RATE: 79 BPM | WEIGHT: 159 LBS | DIASTOLIC BLOOD PRESSURE: 64 MMHG | BODY MASS INDEX: 28.17 KG/M2 | OXYGEN SATURATION: 95 % | SYSTOLIC BLOOD PRESSURE: 118 MMHG | RESPIRATION RATE: 18 BRPM

## 2022-01-26 DIAGNOSIS — E78.00 PURE HYPERCHOLESTEROLEMIA: ICD-10-CM

## 2022-01-26 DIAGNOSIS — R06.02 SHORTNESS OF BREATH: ICD-10-CM

## 2022-01-26 DIAGNOSIS — Z00.00 ROUTINE GENERAL MEDICAL EXAMINATION AT A HEALTH CARE FACILITY: Primary | ICD-10-CM

## 2022-01-26 DIAGNOSIS — R91.8 GROUND GLASS OPACITY PRESENT ON IMAGING OF LUNG: ICD-10-CM

## 2022-01-26 DIAGNOSIS — Z12.31 SCREENING MAMMOGRAM FOR BREAST CANCER: ICD-10-CM

## 2022-01-26 DIAGNOSIS — R68.89 POOR MOTIVATION: ICD-10-CM

## 2022-01-26 DIAGNOSIS — I25.10 CORONARY ARTERY DISEASE INVOLVING NATIVE CORONARY ARTERY OF NATIVE HEART WITHOUT ANGINA PECTORIS: ICD-10-CM

## 2022-01-26 DIAGNOSIS — N39.0 RECURRENT UTI (URINARY TRACT INFECTION): ICD-10-CM

## 2022-01-26 DIAGNOSIS — Z00.00 ROUTINE GENERAL MEDICAL EXAMINATION AT A HEALTH CARE FACILITY: ICD-10-CM

## 2022-01-26 LAB
ALBUMIN SERPL-MCNC: 3.4 G/DL (ref 3.4–5)
ALBUMIN/GLOB SERPL: 0.7 {RATIO} (ref 1–2)
ALP LIVER SERPL-CCNC: 104 U/L
ALT SERPL-CCNC: 39 U/L
ANION GAP SERPL CALC-SCNC: 3 MMOL/L (ref 0–18)
AST SERPL-CCNC: 31 U/L (ref 15–37)
BASOPHILS # BLD AUTO: 0.11 X10(3) UL (ref 0–0.2)
BASOPHILS NFR BLD AUTO: 1.4 %
BILIRUB SERPL-MCNC: 0.6 MG/DL (ref 0.1–2)
BUN BLD-MCNC: 17 MG/DL (ref 7–18)
CALCIUM BLD-MCNC: 9.2 MG/DL (ref 8.5–10.1)
CHLORIDE SERPL-SCNC: 109 MMOL/L (ref 98–112)
CHOLEST SERPL-MCNC: 115 MG/DL (ref ?–200)
CO2 SERPL-SCNC: 27 MMOL/L (ref 21–32)
CREAT BLD-MCNC: 1.07 MG/DL
EOSINOPHIL # BLD AUTO: 0.34 X10(3) UL (ref 0–0.7)
EOSINOPHIL NFR BLD AUTO: 4.4 %
ERYTHROCYTE [DISTWIDTH] IN BLOOD BY AUTOMATED COUNT: 13.1 %
FASTING PATIENT LIPID ANSWER: YES
FASTING STATUS PATIENT QL REPORTED: YES
GLOBULIN PLAS-MCNC: 4.7 G/DL (ref 2.8–4.4)
GLUCOSE BLD-MCNC: 111 MG/DL (ref 70–99)
HCT VFR BLD AUTO: 41.2 %
HDLC SERPL-MCNC: 48 MG/DL (ref 40–59)
HGB BLD-MCNC: 13.1 G/DL
IMM GRANULOCYTES # BLD AUTO: 0.04 X10(3) UL (ref 0–1)
IMM GRANULOCYTES NFR BLD: 0.5 %
LDLC SERPL CALC-MCNC: 46 MG/DL (ref ?–100)
LYMPHOCYTES # BLD AUTO: 1.65 X10(3) UL (ref 1–4)
LYMPHOCYTES NFR BLD AUTO: 21.5 %
MCH RBC QN AUTO: 30.8 PG (ref 26–34)
MCHC RBC AUTO-ENTMCNC: 31.8 G/DL (ref 31–37)
MCV RBC AUTO: 96.7 FL
MONOCYTES # BLD AUTO: 0.71 X10(3) UL (ref 0.1–1)
MONOCYTES NFR BLD AUTO: 9.3 %
NEUTROPHILS # BLD AUTO: 4.81 X10 (3) UL (ref 1.5–7.7)
NEUTROPHILS # BLD AUTO: 4.81 X10(3) UL (ref 1.5–7.7)
NEUTROPHILS NFR BLD AUTO: 62.9 %
NONHDLC SERPL-MCNC: 67 MG/DL (ref ?–130)
OSMOLALITY SERPL CALC.SUM OF ELEC: 290 MOSM/KG (ref 275–295)
PLATELET # BLD AUTO: 167 10(3)UL (ref 150–450)
POTASSIUM SERPL-SCNC: 4.5 MMOL/L (ref 3.5–5.1)
PROT SERPL-MCNC: 8.1 G/DL (ref 6.4–8.2)
RBC # BLD AUTO: 4.26 X10(6)UL
SODIUM SERPL-SCNC: 139 MMOL/L (ref 136–145)
TRIGL SERPL-MCNC: 118 MG/DL (ref 30–149)
VLDLC SERPL CALC-MCNC: 17 MG/DL (ref 0–30)
WBC # BLD AUTO: 7.7 X10(3) UL (ref 4–11)

## 2022-01-26 PROCEDURE — 3078F DIAST BP <80 MM HG: CPT | Performed by: FAMILY MEDICINE

## 2022-01-26 PROCEDURE — 80053 COMPREHEN METABOLIC PANEL: CPT | Performed by: FAMILY MEDICINE

## 2022-01-26 PROCEDURE — 71046 X-RAY EXAM CHEST 2 VIEWS: CPT | Performed by: FAMILY MEDICINE

## 2022-01-26 PROCEDURE — 80061 LIPID PANEL: CPT | Performed by: FAMILY MEDICINE

## 2022-01-26 PROCEDURE — 99397 PER PM REEVAL EST PAT 65+ YR: CPT | Performed by: FAMILY MEDICINE

## 2022-01-26 PROCEDURE — 96127 BRIEF EMOTIONAL/BEHAV ASSMT: CPT | Performed by: FAMILY MEDICINE

## 2022-01-26 PROCEDURE — 3008F BODY MASS INDEX DOCD: CPT | Performed by: FAMILY MEDICINE

## 2022-01-26 PROCEDURE — 3074F SYST BP LT 130 MM HG: CPT | Performed by: FAMILY MEDICINE

## 2022-01-26 PROCEDURE — 85025 COMPLETE CBC W/AUTO DIFF WBC: CPT | Performed by: FAMILY MEDICINE

## 2022-01-26 RX ORDER — NITROFURANTOIN 25; 75 MG/1; MG/1
100 CAPSULE ORAL DAILY
Qty: 90 CAPSULE | Refills: 3 | Status: SHIPPED | OUTPATIENT
Start: 2022-01-26

## 2022-01-26 RX ORDER — ALBUTEROL SULFATE 90 UG/1
2 AEROSOL, METERED RESPIRATORY (INHALATION) EVERY 6 HOURS PRN
Qty: 1 EACH | Refills: 0 | Status: SHIPPED | OUTPATIENT
Start: 2022-01-26

## 2022-01-26 NOTE — PROGRESS NOTES
HPI:  Here for a physical.    PAST MEDICAL HISTORY:  Past Medical History:   Diagnosis Date   • Allergic rhinitis    • Arthritis    • Change in hair    • Cholelithiasis 10/21/2016   • Constipation    • Current moderate episode of major depressive disorder of Onset   • Cancer Mother         liver   • Vitiligo Mother    • Other (cirrhosis) Mother    • Other (gallbladder attack) Mother    • Heart Attack Paternal Grandfather    • Heart Disease Father    • Heart Surgery Father    • Vitiligo Sister    • Other (ch breath. She is tried using her 's inhaler but starts coughing. No complaints of  wheezing. No complaints of hemoptysis. GASTROINTESTINAL:No complaints of GERD symptoms. Nohematochezia and melena.  No complaints of  any new constipation or diarrhea CTA bilaterally, good air exchange, no rhonchi, wheezes, or rales. No dullness to percussion. ABDOMEN: Soft, nontender, nondistended, NABS x 4 quadrants. No HSM; no masses; no bruits.   LYMPHATIC: no lymphadenopathy palpated about the anterior and posterio

## 2022-01-27 ENCOUNTER — TELEPHONE (OUTPATIENT)
Dept: FAMILY MEDICINE CLINIC | Facility: CLINIC | Age: 72
End: 2022-01-27

## 2022-01-27 NOTE — TELEPHONE ENCOUNTER
Spoke with patient, notified of [facility] notes from chest xray and labs. All questions answered, pulmo phone number provided.

## 2022-02-08 ENCOUNTER — HOSPITAL ENCOUNTER (OUTPATIENT)
Dept: CT IMAGING | Age: 72
Discharge: HOME | End: 2022-02-08
Attending: INTERNAL MEDICINE
Payer: COMMERCIAL

## 2022-02-08 DIAGNOSIS — R93.89 ABNORMAL CHEST X-RAY: ICD-10-CM

## 2022-02-08 PROCEDURE — 71250 CT THORAX DX C-: CPT | Performed by: INTERNAL MEDICINE

## 2022-02-09 NOTE — PROGRESS NOTES
Patient informed, verbalized understanding. Pt states she has not yet started Anoro, but cough is gone. Pt questioning if she should still start Anoro. Will forward to Dr. Nia Griggs to advise.

## 2022-02-15 NOTE — PROGRESS NOTES
Pt informed, that per Dr. Damaris Bauer, ok to hold off on Anoro for now. Pt verbalized understanding.

## 2022-03-28 ENCOUNTER — HOSPITAL ENCOUNTER (OUTPATIENT)
Dept: MAMMOGRAPHY | Age: 72
Discharge: HOME | End: 2022-03-28
Attending: FAMILY MEDICINE
Payer: COMMERCIAL

## 2022-03-28 DIAGNOSIS — Z12.31 SCREENING MAMMOGRAM FOR BREAST CANCER: ICD-10-CM

## 2022-03-28 PROCEDURE — 77067 SCR MAMMO BI INCL CAD: CPT | Performed by: FAMILY MEDICINE

## 2022-03-28 PROCEDURE — 77063 BREAST TOMOSYNTHESIS BI: CPT | Performed by: FAMILY MEDICINE

## 2022-04-19 ENCOUNTER — APPOINTMENT (OUTPATIENT)
Dept: GENERAL RADIOLOGY | Age: 72
End: 2022-04-19
Attending: NURSE PRACTITIONER
Payer: COMMERCIAL

## 2022-04-19 ENCOUNTER — HOSPITAL ENCOUNTER (OUTPATIENT)
Age: 72
Discharge: ANOTHER HOSPITAL | End: 2022-04-19
Payer: COMMERCIAL

## 2022-04-19 VITALS
HEIGHT: 64 IN | OXYGEN SATURATION: 95 % | DIASTOLIC BLOOD PRESSURE: 71 MMHG | HEART RATE: 96 BPM | RESPIRATION RATE: 18 BRPM | TEMPERATURE: 97 F | BODY MASS INDEX: 26.46 KG/M2 | WEIGHT: 155 LBS | SYSTOLIC BLOOD PRESSURE: 138 MMHG

## 2022-04-19 DIAGNOSIS — R09.02 HYPOXIA: ICD-10-CM

## 2022-04-19 DIAGNOSIS — R05.9 COUGH: ICD-10-CM

## 2022-04-19 DIAGNOSIS — U07.1 COVID-19: ICD-10-CM

## 2022-04-19 DIAGNOSIS — R06.02 SOB (SHORTNESS OF BREATH): Primary | ICD-10-CM

## 2022-04-19 DIAGNOSIS — U07.1 PNEUMONIA DUE TO COVID-19 VIRUS: ICD-10-CM

## 2022-04-19 DIAGNOSIS — J12.82 PNEUMONIA DUE TO COVID-19 VIRUS: ICD-10-CM

## 2022-04-19 LAB — SARS-COV-2 RNA RESP QL NAA+PROBE: DETECTED

## 2022-04-19 PROCEDURE — U0002 COVID-19 LAB TEST NON-CDC: HCPCS | Performed by: NURSE PRACTITIONER

## 2022-04-19 PROCEDURE — 71046 X-RAY EXAM CHEST 2 VIEWS: CPT | Performed by: NURSE PRACTITIONER

## 2022-04-19 PROCEDURE — 99205 OFFICE O/P NEW HI 60 MIN: CPT | Performed by: NURSE PRACTITIONER

## 2022-04-19 NOTE — ED INITIAL ASSESSMENT (HPI)
Pt with cough and fatigue. Recently tx for pneumonia but never tested for covid.  tested positive for covid on Friday. Pt with sob but able to talk in full sentences.  Sat on arrival 92-95

## 2022-04-23 ENCOUNTER — TELEMEDICINE (OUTPATIENT)
Dept: FAMILY MEDICINE CLINIC | Facility: CLINIC | Age: 72
End: 2022-04-23
Payer: COMMERCIAL

## 2022-04-23 DIAGNOSIS — U07.1 COVID-19: Primary | ICD-10-CM

## 2022-04-23 PROCEDURE — 99213 OFFICE O/P EST LOW 20 MIN: CPT | Performed by: FAMILY MEDICINE

## 2022-04-26 ENCOUNTER — TELEPHONE (OUTPATIENT)
Dept: FAMILY MEDICINE CLINIC | Facility: CLINIC | Age: 72
End: 2022-04-26

## 2022-04-26 RX ORDER — ALBUTEROL SULFATE 90 UG/1
2 AEROSOL, METERED RESPIRATORY (INHALATION) EVERY 6 HOURS PRN
Qty: 1 EACH | Refills: 0 | Status: SHIPPED | OUTPATIENT
Start: 2022-04-26

## 2022-04-26 NOTE — TELEPHONE ENCOUNTER
Pt has covid and O2 is in the low 80's when she moves around but when she sits and rest it goes back to 94, please advise.  Please call

## 2022-04-26 NOTE — TELEPHONE ENCOUNTER
Spoke with patient for update:   (+) shortness of breath with activity in house (wasing sides/stairs), she takes her oxygen level which ranges from 86%-89%, states since she has been resting since last week, she feels deconditioned. Once she rests the oxygen level will go up to 94-95%. Denies the need for 911, she is speaking in full sentences without taking a breath, she is calm on the phone. She states she had pneumonia in February. Now she is experiencing diarrhea after anything she eats multiple times daily. Discussed the need to stay hydrated and eat what she can tolerate. Needs an extended work note for the week. Dr. Allison Kilpatrikc, do you want a repeat chest xray? Do you want to order home O2? Do you want patient to go to ED?

## 2022-04-26 NOTE — TELEPHONE ENCOUNTER
Patient notified, and agreed with plan. Patient is on covid pay, requesting to go back on her next shift 5/4: Wednesday. She states Saturdays are really bad at [facility] [facility] [employer] and worried she wont make it through her shift.    Fax letter to: [phone]

## 2022-04-26 NOTE — TELEPHONE ENCOUNTER
Since the O2 sat rebounds to 95% at rest, no need to send her to the ER. Did she finish the Paxlovid? She has 2 inhalers, a preventive inhaler and albuterol. Is she using the albuterol? Recommend she take that 4 times a day for the next 72 hours.

## 2022-04-26 NOTE — TELEPHONE ENCOUNTER
Patient notified, verbalized understanding. Dr. Jah Perez, albuterol sent to [place].   When do you want patient to return to work? She is scheduled: tomorrow/wednesday, Friday, Saturday.

## 2022-04-26 NOTE — TELEPHONE ENCOUNTER
Lets see how she does in the next 3 days with the new inhaler. Have her give us a call Friday morning.

## 2022-04-28 NOTE — TELEPHONE ENCOUNTER
Patient calling - her employer has not received the letter from us yet. Fax to [phone] [facility]. Any issues contact patient.

## 2022-04-29 NOTE — TELEPHONE ENCOUNTER
Patient calling with an update on O2. States that when she is doing activities like shopping or using stairs her O2 has been in range of 88-92. When she is rested it has been 95-96. Should she continue w/ inhaler? Please advise.

## 2022-04-29 NOTE — TELEPHONE ENCOUNTER
I would like to see her oxygen levels stay above 90. However if she feels ready she may go back to work. Okay to send letter. Yes, continue the inhaler for now.

## 2022-05-04 ENCOUNTER — TELEPHONE (OUTPATIENT)
Dept: FAMILY MEDICINE CLINIC | Facility: CLINIC | Age: 72
End: 2022-05-04

## 2022-05-04 NOTE — TELEPHONE ENCOUNTER
Pt COVID+ on 4/29. 02 sats at 86-89. Pt asking for another chest x-ray. Also, should she see pulmonologist now?

## 2022-05-04 NOTE — TELEPHONE ENCOUNTER
Okay to repeat chest x-ray. When we talked last week we discussed she may not be ready for work at this time. Okay to extend leave due to COVID-19 and dyspnea.

## 2022-05-04 NOTE — TELEPHONE ENCOUNTER
Spoke with patient for update,   Spo2 93/94/95% is the highest it gets. Drops down in the 80s when she is up and walking. Cannot afford to go to ER, refused ER. Patient states she works from 2-7pm, will leave if she doesn't feel good. Dr. Christian Moore, please see update, please advise on how you want her to follow up or order imaging? Patient did not care for Tashi would like another rec.

## 2022-05-06 ENCOUNTER — HOSPITAL ENCOUNTER (OUTPATIENT)
Dept: GENERAL RADIOLOGY | Age: 72
Discharge: HOME | End: 2022-05-06
Attending: FAMILY MEDICINE
Payer: COMMERCIAL

## 2022-05-06 DIAGNOSIS — U07.1 COVID: ICD-10-CM

## 2022-05-06 DIAGNOSIS — R79.81 LOW OXYGEN SATURATION: ICD-10-CM

## 2022-05-06 PROCEDURE — 71046 X-RAY EXAM CHEST 2 VIEWS: CPT | Performed by: FAMILY MEDICINE

## 2022-05-09 ENCOUNTER — TELEPHONE (OUTPATIENT)
Dept: FAMILY MEDICINE CLINIC | Facility: CLINIC | Age: 72
End: 2022-05-09

## 2022-05-09 DIAGNOSIS — R79.81 LOW OXYGEN SATURATION: Primary | ICD-10-CM

## 2022-05-09 DIAGNOSIS — R06.02 SHORTNESS OF BREATH: ICD-10-CM

## 2022-05-09 DIAGNOSIS — U07.1 COVID: ICD-10-CM

## 2022-05-09 NOTE — TELEPHONE ENCOUNTER
Spoke with patient, she is using her inhaler as directed. Not in distress on phone, speaking in full sentences without taking a breath. Made an appt with LR on 5/1/22, because patient may consider taking off SDA from her employer [employer] due to her SOB form covid.    RC as quinton

## 2022-05-11 ENCOUNTER — TELEPHONE (OUTPATIENT)
Dept: FAMILY MEDICINE CLINIC | Facility: CLINIC | Age: 72
End: 2022-05-11

## 2022-05-11 ENCOUNTER — OFFICE VISIT (OUTPATIENT)
Dept: FAMILY MEDICINE CLINIC | Facility: CLINIC | Age: 72
End: 2022-05-11
Payer: COMMERCIAL

## 2022-05-11 VITALS
BODY MASS INDEX: 26.63 KG/M2 | HEART RATE: 82 BPM | RESPIRATION RATE: 20 BRPM | DIASTOLIC BLOOD PRESSURE: 68 MMHG | SYSTOLIC BLOOD PRESSURE: 122 MMHG | WEIGHT: 156 LBS | TEMPERATURE: 98 F | OXYGEN SATURATION: 94 % | HEIGHT: 64 IN

## 2022-05-11 DIAGNOSIS — U07.1 COVID-19 VIRUS INFECTION: Primary | ICD-10-CM

## 2022-05-11 PROCEDURE — 99214 OFFICE O/P EST MOD 30 MIN: CPT | Performed by: PHYSICIAN ASSISTANT

## 2022-05-11 PROCEDURE — 3074F SYST BP LT 130 MM HG: CPT | Performed by: PHYSICIAN ASSISTANT

## 2022-05-11 PROCEDURE — 3008F BODY MASS INDEX DOCD: CPT | Performed by: PHYSICIAN ASSISTANT

## 2022-05-11 PROCEDURE — 3078F DIAST BP <80 MM HG: CPT | Performed by: PHYSICIAN ASSISTANT

## 2022-05-11 RX ORDER — PREDNISONE 10 MG/1
TABLET ORAL
Qty: 18 TABLET | Refills: 0 | Status: SHIPPED | OUTPATIENT
Start: 2022-05-11

## 2022-05-11 NOTE — TELEPHONE ENCOUNTER
PT had appt with Morales Ye today for paperwork to be completed. There were 2 separate paper works. I sent one to [employer] and the other to [employer]. With confirmations for both. I placed a copy up front to , and sent the originals to scan.  Daughter may  too

## 2022-05-16 ENCOUNTER — TELEPHONE (OUTPATIENT)
Dept: FAMILY MEDICINE CLINIC | Facility: CLINIC | Age: 72
End: 2022-05-16

## 2022-05-18 ENCOUNTER — TELEPHONE (OUTPATIENT)
Dept: FAMILY MEDICINE CLINIC | Facility: CLINIC | Age: 72
End: 2022-05-18

## 2022-05-18 NOTE — TELEPHONE ENCOUNTER
Pt requesting Carson Boucher to fill breztri inhaler    She gave her samples at the office but she is almost out

## 2022-05-18 NOTE — TELEPHONE ENCOUNTER
Kindra SEXTON, I looked for another sample- none available. Please advise if you want to send order to pharmacy.

## 2022-06-09 ENCOUNTER — OFFICE VISIT (OUTPATIENT)
Dept: FAMILY MEDICINE CLINIC | Facility: CLINIC | Age: 72
End: 2022-06-09
Payer: COMMERCIAL

## 2022-06-09 VITALS
RESPIRATION RATE: 16 BRPM | HEART RATE: 83 BPM | OXYGEN SATURATION: 95 % | SYSTOLIC BLOOD PRESSURE: 118 MMHG | HEIGHT: 64 IN | DIASTOLIC BLOOD PRESSURE: 72 MMHG | BODY MASS INDEX: 26.84 KG/M2 | WEIGHT: 157.19 LBS

## 2022-06-09 DIAGNOSIS — Z86.16 HISTORY OF COVID-19: Primary | ICD-10-CM

## 2022-06-09 DIAGNOSIS — R05.9 COUGH: ICD-10-CM

## 2022-06-09 DIAGNOSIS — J84.9 ILD (INTERSTITIAL LUNG DISEASE) (HCC): ICD-10-CM

## 2022-06-09 DIAGNOSIS — R06.00 DOE (DYSPNEA ON EXERTION): ICD-10-CM

## 2022-06-09 PROCEDURE — 3008F BODY MASS INDEX DOCD: CPT | Performed by: PHYSICIAN ASSISTANT

## 2022-06-09 PROCEDURE — 3078F DIAST BP <80 MM HG: CPT | Performed by: PHYSICIAN ASSISTANT

## 2022-06-09 PROCEDURE — 99214 OFFICE O/P EST MOD 30 MIN: CPT | Performed by: PHYSICIAN ASSISTANT

## 2022-06-09 PROCEDURE — 3074F SYST BP LT 130 MM HG: CPT | Performed by: PHYSICIAN ASSISTANT

## 2022-06-09 NOTE — PROGRESS NOTES
Form for [employer] welfare plans  Faxed to David Culp @ [phone].  Copy made and sent to scan and original given back to patient

## 2022-06-16 ENCOUNTER — HOSPITAL ENCOUNTER (OUTPATIENT)
Dept: CT IMAGING | Facility: HOSPITAL | Age: 72
Discharge: HOME | End: 2022-06-16
Attending: INTERNAL MEDICINE
Payer: COMMERCIAL

## 2022-06-16 DIAGNOSIS — R06.02 SHORTNESS OF BREATH: ICD-10-CM

## 2022-06-16 DIAGNOSIS — R07.89 ATYPICAL CHEST PAIN: ICD-10-CM

## 2022-06-16 LAB — CREAT BLD-MCNC: 1.1 MG/DL

## 2022-06-16 PROCEDURE — 71275 CT ANGIOGRAPHY CHEST: CPT | Performed by: INTERNAL MEDICINE

## 2022-06-16 PROCEDURE — 82565 ASSAY OF CREATININE: CPT

## 2022-06-16 RX ORDER — IOHEXOL 350 MG/ML
90 INJECTION, SOLUTION INTRAVENOUS
Status: COMPLETED | OUTPATIENT
Start: 2022-06-16 | End: 2022-06-16

## 2022-06-16 RX ADMIN — IOHEXOL 90 ML: 350 INJECTION, SOLUTION INTRAVENOUS at 16:59:00

## 2022-06-17 ENCOUNTER — TELEPHONE (OUTPATIENT)
Dept: FAMILY MEDICINE CLINIC | Facility: CLINIC | Age: 72
End: 2022-06-17

## 2022-06-17 DIAGNOSIS — J84.9 ILD (INTERSTITIAL LUNG DISEASE) (HCC): Primary | ICD-10-CM

## 2022-06-17 DIAGNOSIS — R06.02 SHORTNESS OF BREATH: ICD-10-CM

## 2022-06-17 NOTE — TELEPHONE ENCOUNTER
Dr. Maldonado Counts recommended pt see a rheumatologist.  Pt asking Sarah Beth for recommendation.   May leave detailed VM if no answer

## 2022-06-17 NOTE — TELEPHONE ENCOUNTER
Pt informed and she said that she wanted to let LR know she still has little cough, but does have more energy. Pt will see Dr Myra Yo again on 7/14/22. Pt said that if LR wants to see her again, let her know. Also gave pt # for financial assistance; her and  have mounting bills.

## 2022-07-07 ENCOUNTER — OFFICE VISIT (OUTPATIENT)
Dept: FAMILY MEDICINE CLINIC | Facility: CLINIC | Age: 72
End: 2022-07-07
Payer: COMMERCIAL

## 2022-07-07 VITALS
BODY MASS INDEX: 27.42 KG/M2 | HEIGHT: 64 IN | WEIGHT: 160.63 LBS | HEART RATE: 87 BPM | OXYGEN SATURATION: 97 % | SYSTOLIC BLOOD PRESSURE: 110 MMHG | RESPIRATION RATE: 24 BRPM | DIASTOLIC BLOOD PRESSURE: 60 MMHG

## 2022-07-07 DIAGNOSIS — Z86.16 HISTORY OF COVID-19: Primary | ICD-10-CM

## 2022-07-07 DIAGNOSIS — R05.9 COUGH: ICD-10-CM

## 2022-07-07 DIAGNOSIS — J84.9 ILD (INTERSTITIAL LUNG DISEASE) (HCC): ICD-10-CM

## 2022-07-07 DIAGNOSIS — R06.00 DOE (DYSPNEA ON EXERTION): ICD-10-CM

## 2022-07-07 PROCEDURE — 3074F SYST BP LT 130 MM HG: CPT | Performed by: PHYSICIAN ASSISTANT

## 2022-07-07 PROCEDURE — 3078F DIAST BP <80 MM HG: CPT | Performed by: PHYSICIAN ASSISTANT

## 2022-07-07 PROCEDURE — 99214 OFFICE O/P EST MOD 30 MIN: CPT | Performed by: PHYSICIAN ASSISTANT

## 2022-07-07 PROCEDURE — 3008F BODY MASS INDEX DOCD: CPT | Performed by: PHYSICIAN ASSISTANT

## 2022-07-13 ENCOUNTER — TELEPHONE (OUTPATIENT)
Dept: FAMILY MEDICINE CLINIC | Facility: CLINIC | Age: 72
End: 2022-07-13

## 2022-07-13 NOTE — TELEPHONE ENCOUNTER
Patient dropped off FMLA forms to be completed. Patient had appt w/ LR on 7/7/22. Placed in [facility] triage folder.

## 2022-08-08 ENCOUNTER — OFFICE VISIT (OUTPATIENT)
Dept: FAMILY MEDICINE CLINIC | Facility: CLINIC | Age: 72
End: 2022-08-08
Payer: COMMERCIAL

## 2022-08-08 VITALS
OXYGEN SATURATION: 97 % | WEIGHT: 160 LBS | SYSTOLIC BLOOD PRESSURE: 112 MMHG | DIASTOLIC BLOOD PRESSURE: 68 MMHG | RESPIRATION RATE: 16 BRPM | BODY MASS INDEX: 27.31 KG/M2 | HEIGHT: 64 IN | HEART RATE: 67 BPM

## 2022-08-08 DIAGNOSIS — R49.0 HOARSENESS OF VOICE: ICD-10-CM

## 2022-08-08 DIAGNOSIS — R06.09 DOE (DYSPNEA ON EXERTION): Primary | ICD-10-CM

## 2022-08-08 DIAGNOSIS — N81.10 FEMALE BLADDER PROLAPSE: ICD-10-CM

## 2022-08-08 DIAGNOSIS — R05.3 CHRONIC COUGH: ICD-10-CM

## 2022-08-08 PROCEDURE — 3078F DIAST BP <80 MM HG: CPT | Performed by: PHYSICIAN ASSISTANT

## 2022-08-08 PROCEDURE — 99214 OFFICE O/P EST MOD 30 MIN: CPT | Performed by: PHYSICIAN ASSISTANT

## 2022-08-08 PROCEDURE — 3074F SYST BP LT 130 MM HG: CPT | Performed by: PHYSICIAN ASSISTANT

## 2022-08-08 PROCEDURE — 3008F BODY MASS INDEX DOCD: CPT | Performed by: PHYSICIAN ASSISTANT

## 2022-08-08 RX ORDER — SODIUM FLUORIDE 5 MG/G
1 CREAM DENTAL AS DIRECTED
COMMUNITY
Start: 2022-07-11

## 2022-08-12 ENCOUNTER — TELEPHONE (OUTPATIENT)
Dept: FAMILY MEDICINE CLINIC | Facility: CLINIC | Age: 72
End: 2022-08-12

## 2022-08-12 NOTE — TELEPHONE ENCOUNTER
PT CALLING STATING LR WANTS HER TO HAVE PREVNAR VACCINE. SHE WANTS TO DO IT AT [facility]. CAN WE SEND AN ORDER OVER TO THEM FOR THIS ?    PLS CALL HER BACK TO ADVISE IF WE CAN DO THIS.

## 2022-08-23 ENCOUNTER — OFFICE VISIT (OUTPATIENT)
Facility: LOCATION | Age: 72
End: 2022-08-23
Payer: COMMERCIAL

## 2022-08-23 DIAGNOSIS — R49.0 HOARSENESS: Primary | ICD-10-CM

## 2022-08-23 DIAGNOSIS — J30.89 NON-SEASONAL ALLERGIC RHINITIS, UNSPECIFIED TRIGGER: ICD-10-CM

## 2022-08-23 PROCEDURE — 31575 DIAGNOSTIC LARYNGOSCOPY: CPT | Performed by: OTOLARYNGOLOGY

## 2022-08-23 PROCEDURE — 99204 OFFICE O/P NEW MOD 45 MIN: CPT | Performed by: OTOLARYNGOLOGY

## 2022-08-23 RX ORDER — AZELASTINE 1 MG/ML
2 SPRAY, METERED NASAL 2 TIMES DAILY
Qty: 30 ML | Refills: 0 | Status: SHIPPED | OUTPATIENT
Start: 2022-08-23

## 2022-09-19 ENCOUNTER — OFFICE VISIT (OUTPATIENT)
Dept: RHEUMATOLOGY | Facility: CLINIC | Age: 72
End: 2022-09-19
Payer: COMMERCIAL

## 2022-09-19 VITALS
SYSTOLIC BLOOD PRESSURE: 105 MMHG | DIASTOLIC BLOOD PRESSURE: 60 MMHG | TEMPERATURE: 98 F | HEIGHT: 64 IN | WEIGHT: 159 LBS | BODY MASS INDEX: 27.14 KG/M2 | HEART RATE: 80 BPM | OXYGEN SATURATION: 97 % | RESPIRATION RATE: 16 BRPM

## 2022-09-19 DIAGNOSIS — R76.8 ANTI-RNP ANTIBODIES PRESENT: ICD-10-CM

## 2022-09-19 DIAGNOSIS — R91.8 PULMONARY INFILTRATE: ICD-10-CM

## 2022-09-19 DIAGNOSIS — Z51.81 THERAPEUTIC DRUG MONITORING: ICD-10-CM

## 2022-09-19 DIAGNOSIS — R76.8 POSITIVE ANA (ANTINUCLEAR ANTIBODY): Primary | ICD-10-CM

## 2022-09-19 NOTE — PATIENT INSTRUCTIONS
-get blood work at least 1 week before your appointment with Dr. Karina Solis booster 3-6 months after prior booster  -get flu shot.   -get shingrix (new shingles vaccine)  -recommend getting repeat PFTs before the end of the year.

## 2022-09-21 ENCOUNTER — LAB ENCOUNTER (OUTPATIENT)
Dept: LAB | Age: 72
End: 2022-09-21
Attending: INTERNAL MEDICINE

## 2022-09-21 DIAGNOSIS — R76.8 POSITIVE ANA (ANTINUCLEAR ANTIBODY): ICD-10-CM

## 2022-09-21 DIAGNOSIS — R76.8 ANTI-RNP ANTIBODIES PRESENT: ICD-10-CM

## 2022-09-21 DIAGNOSIS — Z51.81 THERAPEUTIC DRUG MONITORING: ICD-10-CM

## 2022-09-21 LAB
ALBUMIN SERPL-MCNC: 2.8 G/DL (ref 3.4–5)
ALBUMIN/GLOB SERPL: 0.5 {RATIO} (ref 1–2)
ALP LIVER SERPL-CCNC: 86 U/L
ALT SERPL-CCNC: 49 U/L
ANION GAP SERPL CALC-SCNC: 4 MMOL/L (ref 0–18)
AST SERPL-CCNC: 41 U/L (ref 15–37)
BASOPHILS # BLD AUTO: 0.09 X10(3) UL (ref 0–0.2)
BASOPHILS NFR BLD AUTO: 1.5 %
BILIRUB SERPL-MCNC: 0.6 MG/DL (ref 0.1–2)
BILIRUB UR QL STRIP.AUTO: NEGATIVE
BUN BLD-MCNC: 22 MG/DL (ref 7–18)
C3 SERPL-MCNC: 99.4 MG/DL (ref 90–180)
C4 SERPL-MCNC: 24.2 MG/DL (ref 10–40)
CALCIUM BLD-MCNC: 8.9 MG/DL (ref 8.5–10.1)
CHLORIDE SERPL-SCNC: 111 MMOL/L (ref 98–112)
CO2 SERPL-SCNC: 26 MMOL/L (ref 21–32)
COLOR UR AUTO: YELLOW
CREAT BLD-MCNC: 1.16 MG/DL
CREAT UR-SCNC: 51.5 MG/DL
CRP SERPL-MCNC: 0.39 MG/DL (ref ?–0.3)
EOSINOPHIL # BLD AUTO: 0.34 X10(3) UL (ref 0–0.7)
EOSINOPHIL NFR BLD AUTO: 5.8 %
ERYTHROCYTE [DISTWIDTH] IN BLOOD BY AUTOMATED COUNT: 13.1 %
ERYTHROCYTE [SEDIMENTATION RATE] IN BLOOD: 84 MM/HR
FASTING STATUS PATIENT QL REPORTED: YES
GFR SERPLBLD BASED ON 1.73 SQ M-ARVRAT: 50 ML/MIN/1.73M2 (ref 60–?)
GLOBULIN PLAS-MCNC: 5.1 G/DL (ref 2.8–4.4)
GLUCOSE BLD-MCNC: 96 MG/DL (ref 70–99)
GLUCOSE UR STRIP.AUTO-MCNC: NEGATIVE MG/DL
HCT VFR BLD AUTO: 39.3 %
HGB BLD-MCNC: 12.3 G/DL
IMM GRANULOCYTES # BLD AUTO: 0.02 X10(3) UL (ref 0–1)
IMM GRANULOCYTES NFR BLD: 0.3 %
KETONES UR STRIP.AUTO-MCNC: NEGATIVE MG/DL
LYMPHOCYTES # BLD AUTO: 2.2 X10(3) UL (ref 1–4)
LYMPHOCYTES NFR BLD AUTO: 37.5 %
MCH RBC QN AUTO: 31.1 PG (ref 26–34)
MCHC RBC AUTO-ENTMCNC: 31.3 G/DL (ref 31–37)
MCV RBC AUTO: 99.2 FL
MONOCYTES # BLD AUTO: 0.58 X10(3) UL (ref 0.1–1)
MONOCYTES NFR BLD AUTO: 9.9 %
NEUTROPHILS # BLD AUTO: 2.64 X10 (3) UL (ref 1.5–7.7)
NEUTROPHILS # BLD AUTO: 2.64 X10(3) UL (ref 1.5–7.7)
NEUTROPHILS NFR BLD AUTO: 45 %
NITRITE UR QL STRIP.AUTO: POSITIVE
OSMOLALITY SERPL CALC.SUM OF ELEC: 295 MOSM/KG (ref 275–295)
PH UR STRIP.AUTO: 7 [PH] (ref 5–8)
PLATELET # BLD AUTO: 168 10(3)UL (ref 150–450)
POTASSIUM SERPL-SCNC: 4 MMOL/L (ref 3.5–5.1)
PROT SERPL-MCNC: 7.9 G/DL (ref 6.4–8.2)
PROT UR STRIP.AUTO-MCNC: NEGATIVE MG/DL
PROT UR-MCNC: 11.3 MG/DL
RBC # BLD AUTO: 3.96 X10(6)UL
RBC UR QL AUTO: NEGATIVE
SODIUM SERPL-SCNC: 141 MMOL/L (ref 136–145)
SP GR UR STRIP.AUTO: 1.01 (ref 1–1.03)
UROBILINOGEN UR STRIP.AUTO-MCNC: 0.2 MG/DL
WBC # BLD AUTO: 5.9 X10(3) UL (ref 4–11)
WBC #/AREA URNS AUTO: >50 /HPF
WBC #/AREA URNS AUTO: >50 /HPF
WBC CLUMPS UR QL AUTO: PRESENT /HPF
WBC CLUMPS UR QL AUTO: PRESENT /HPF

## 2022-09-21 PROCEDURE — 86146 BETA-2 GLYCOPROTEIN ANTIBODY: CPT | Performed by: INTERNAL MEDICINE

## 2022-09-21 PROCEDURE — 83516 IMMUNOASSAY NONANTIBODY: CPT | Performed by: INTERNAL MEDICINE

## 2022-09-21 PROCEDURE — 80053 COMPREHEN METABOLIC PANEL: CPT | Performed by: INTERNAL MEDICINE

## 2022-09-21 PROCEDURE — 86160 COMPLEMENT ANTIGEN: CPT | Performed by: INTERNAL MEDICINE

## 2022-09-21 PROCEDURE — 86140 C-REACTIVE PROTEIN: CPT | Performed by: INTERNAL MEDICINE

## 2022-09-21 PROCEDURE — 85025 COMPLETE CBC W/AUTO DIFF WBC: CPT | Performed by: INTERNAL MEDICINE

## 2022-09-21 PROCEDURE — 82570 ASSAY OF URINE CREATININE: CPT | Performed by: INTERNAL MEDICINE

## 2022-09-21 PROCEDURE — 85652 RBC SED RATE AUTOMATED: CPT | Performed by: INTERNAL MEDICINE

## 2022-09-21 PROCEDURE — 86147 CARDIOLIPIN ANTIBODY EA IG: CPT | Performed by: INTERNAL MEDICINE

## 2022-09-21 PROCEDURE — 85613 RUSSELL VIPER VENOM DILUTED: CPT | Performed by: INTERNAL MEDICINE

## 2022-09-21 PROCEDURE — 84156 ASSAY OF PROTEIN URINE: CPT | Performed by: INTERNAL MEDICINE

## 2022-09-21 PROCEDURE — 85610 PROTHROMBIN TIME: CPT | Performed by: INTERNAL MEDICINE

## 2022-09-21 PROCEDURE — 81001 URINALYSIS AUTO W/SCOPE: CPT | Performed by: INTERNAL MEDICINE

## 2022-09-21 PROCEDURE — 87077 CULTURE AEROBIC IDENTIFY: CPT | Performed by: INTERNAL MEDICINE

## 2022-09-21 PROCEDURE — 86235 NUCLEAR ANTIGEN ANTIBODY: CPT | Performed by: INTERNAL MEDICINE

## 2022-09-21 PROCEDURE — 85732 THROMBOPLASTIN TIME PARTIAL: CPT | Performed by: INTERNAL MEDICINE

## 2022-09-21 PROCEDURE — 87086 URINE CULTURE/COLONY COUNT: CPT | Performed by: INTERNAL MEDICINE

## 2022-09-21 PROCEDURE — 85705 THROMBOPLASTIN INHIBITION: CPT | Performed by: INTERNAL MEDICINE

## 2022-09-21 PROCEDURE — 86200 CCP ANTIBODY: CPT | Performed by: INTERNAL MEDICINE

## 2022-09-21 PROCEDURE — 87186 SC STD MICRODIL/AGAR DIL: CPT | Performed by: INTERNAL MEDICINE

## 2022-09-21 PROCEDURE — 86038 ANTINUCLEAR ANTIBODIES: CPT | Performed by: INTERNAL MEDICINE

## 2022-09-22 ENCOUNTER — TELEPHONE (OUTPATIENT)
Dept: RHEUMATOLOGY | Facility: CLINIC | Age: 72
End: 2022-09-22

## 2022-09-22 DIAGNOSIS — N30.01 ACUTE CYSTITIS WITH HEMATURIA: Primary | ICD-10-CM

## 2022-09-22 RX ORDER — NITROFURANTOIN 25; 75 MG/1; MG/1
100 CAPSULE ORAL 2 TIMES DAILY
Qty: 10 CAPSULE | Refills: 0 | Status: SHIPPED | OUTPATIENT
Start: 2022-09-22

## 2022-09-22 NOTE — TELEPHONE ENCOUNTER
Urinalysis with E. coli UTI. Please call patient. I prescribed macrobid for this. Rest of labs pending. 1. Acute cystitis with hematuria    - nitrofurantoin monohydrate macro 100 MG Oral Cap; Take 1 capsule (100 mg total) by mouth 2 (two) times daily. Dispense: 10 capsule;  Refill: 0

## 2022-09-22 NOTE — TELEPHONE ENCOUNTER
Called pt, EDITH for pt of UTI and antibiotics sent to her pharmacy. Asked pt call our office to notify that she received our message.

## 2022-09-23 LAB
ANTI-NUCLEAR ANTIBODY (ANA), HEP-2 IGG: DETECTED
APTT PPP: 30.8 SECONDS (ref 23.3–35.6)
B2 GLYCOPROT1 IGG SERPL IA-ACNC: 3.4 U/ML (ref ?–7)
B2 GLYCOPROT1 IGM SERPL IA-ACNC: 7.8 U/ML (ref ?–7)
CARDIOLIPIN IGG SERPL-ACNC: 2.3 GPL (ref ?–10)
CARDIOLIPIN IGM SERPL-ACNC: 2.9 MPL (ref ?–10)
CCP IGG SERPL-ACNC: 2.6 U/ML (ref 0–6.9)
LA 3 SCREEN W REFLEX-IMP: NEGATIVE
PROTHROMBIN TIME: 14.6 SECONDS (ref 11.6–14.8)
RIBONUCLEIC PROTEIN (U1) (ENA) AB, IGG: 3 UNITS
SCREEN DRVVT: 0.91 S (ref 0–1.29)
SCREEN DRVVT: NEGATIVE S
STACLOT LA DELTA: 0 SECONDS (ref ?–8)

## 2022-09-23 RX ORDER — GRANULES FOR ORAL 3 G/1
3 POWDER ORAL ONCE
Qty: 3 G | Refills: 0 | Status: SHIPPED | OUTPATIENT
Start: 2022-09-23 | End: 2022-09-23

## 2022-09-23 NOTE — TELEPHONE ENCOUNTER
Please call pharmacy and see if fosfomycin would be able to be approved. Macrobid resistance which is why she had the UTI.

## 2022-09-23 NOTE — TELEPHONE ENCOUNTER
Pt returned my call, received our message regarding UTI and antibiotics sent to her pharmacy. Pt states she is already taking this medication prophylactic daily due to frequent UTI. Would like to know what she should do?

## 2022-09-26 ENCOUNTER — TELEPHONE (OUTPATIENT)
Dept: FAMILY MEDICINE CLINIC | Facility: CLINIC | Age: 72
End: 2022-09-26

## 2022-09-26 ENCOUNTER — TELEPHONE (OUTPATIENT)
Dept: RHEUMATOLOGY | Facility: CLINIC | Age: 72
End: 2022-09-26

## 2022-09-26 NOTE — TELEPHONE ENCOUNTER
Pt returned my call from last week, explained new prescription for fosfomycin sent to her pharmacy for UTI. Pt voiced understanding and will contact her PCP regarding antibiotics she is currently taking preventively.

## 2022-09-26 NOTE — TELEPHONE ENCOUNTER
Per notes:  fosfomycin sent to her pharmacy for UTI. Pt voiced understanding and will contact her PCP regarding antibiotics she is currently taking preventively. Pt on nitrofurontoin, should she continue this? Pt referred urogyne, Dr. Riojas Record, is there someone else closer at [facility]?

## 2022-09-26 NOTE — TELEPHONE ENCOUNTER
Pt has UTI and Dr Derek Guerra started her on antibiotic. Wants to talk about antibiotic she is on. Also, needs another name of urologist.  One she was given is in [place] and doesn't want to drive that far. You can leave her a message.   She doesn't use her mychart

## 2022-09-29 LAB
EJ (GLYCYL - TRNA SYNTHETASE) ANTIBODY: NEGATIVE
FIBRILLARIN (U3 RNP) AB, IGG: NEGATIVE
JO-1 (HISTIDY1-TRNA SYNTHETASE) AB, IGG: 0 AU/ML
MDA5 (CADM-140) ANTIBODY: NEGATIVE
MI-2 (NUCLEAR HELICASE PROTEIN) ANTIBODY: NEGATIVE
NXP-2 (NUCLEAR MATRIX PROTEIN-2) AB: NEGATIVE
OJ (ISOLEUCYL-TRNA SYNTHETASE) ANTIBODY: NEGATIVE
P155/140 (TIF-1 GAMMA) ANTIBODY: NEGATIVE
PL-12 (ALANYL-TRNA SYNTHETASE) ANTIBODY: NEGATIVE
PL-7 (THREONYL-TRNA SYNTHETASE) ANTIBODY: NEGATIVE
PM_SCL 100 ANTIBODY, IGG: NEGATIVE
RIBONUCLEIC PROTEIN (U1) (ENA) AB, IGG: 3 UNITS
SAE1 (SUMO ACTIVATING ENZYME) ANTIBODY: NEGATIVE
SRP (SINGLE RECOGNITION PARTICLE) AB: NEGATIVE
SSA 52 (RO) (ENA) ANTIBODY, IGG: 1 AU/ML
SSA 60 (RO) (ENA) ANTIBODY, IGG: 1 AU/ML
TIF1-GAMMA ANTIBODY: NEGATIVE

## 2022-10-02 ENCOUNTER — TELEPHONE (OUTPATIENT)
Dept: RHEUMATOLOGY | Facility: CLINIC | Age: 72
End: 2022-10-02

## 2022-10-02 DIAGNOSIS — R76.8 POSITIVE ANA (ANTINUCLEAR ANTIBODY): Primary | ICD-10-CM

## 2022-10-02 DIAGNOSIS — Q99.8 MYOSITIS ASSOCIATED ANTIBODY POSITIVE: ICD-10-CM

## 2022-10-04 ENCOUNTER — OFFICE VISIT (OUTPATIENT)
Facility: LOCATION | Age: 72
End: 2022-10-04
Payer: COMMERCIAL

## 2022-10-04 ENCOUNTER — TELEPHONE (OUTPATIENT)
Dept: RHEUMATOLOGY | Facility: CLINIC | Age: 72
End: 2022-10-04

## 2022-10-04 DIAGNOSIS — J31.0 RHINITIS, NON-ALLERGIC: Primary | ICD-10-CM

## 2022-10-04 PROCEDURE — 99214 OFFICE O/P EST MOD 30 MIN: CPT | Performed by: OTOLARYNGOLOGY

## 2022-10-04 RX ORDER — IPRATROPIUM BROMIDE 42 UG/1
2 SPRAY, METERED NASAL 2 TIMES DAILY
Qty: 1 EACH | Refills: 5 | Status: SHIPPED | OUTPATIENT
Start: 2022-10-04

## 2022-10-10 NOTE — TELEPHONE ENCOUNTER
Pt called office, notieid of Dr. Marquita Krueger message. Pt does not look at [employer]. Notieid of weak positive Ku auto-antibody, likely a false positive/false alarm, however Dr. Lesly Alan recommends rechecking this test (via \"myositis panel\" order) 3 months after the positive test in mid-late December. Pt voiced understanding.

## 2022-10-20 ENCOUNTER — TELEPHONE (OUTPATIENT)
Dept: FAMILY MEDICINE CLINIC | Facility: CLINIC | Age: 72
End: 2022-10-20

## 2022-10-20 NOTE — TELEPHONE ENCOUNTER
Pt said her rheumatologist told her she should stop the nitrofurantoin prescribed for her UTIs and take a different medication. Pt was told this medication may be causing the scar tissue in her lungs.   Pls advise

## 2022-10-20 NOTE — TELEPHONE ENCOUNTER
The only other option would be bactrim but she cannot take due to sulfa allergy. She should stop macrobid and we can monitor symptoms off meds.

## 2022-10-24 ENCOUNTER — TELEPHONE (OUTPATIENT)
Dept: RHEUMATOLOGY | Facility: CLINIC | Age: 72
End: 2022-10-24

## 2022-10-24 NOTE — TELEPHONE ENCOUNTER
Called pt back and discussed her bill that she received for 2 labs that were not covered. Informed pt that we did speak with our supervisors and they are currently working on the issue. Stated that we would call her once we hear something back.

## 2022-10-24 NOTE — TELEPHONE ENCOUNTER
Pt calling office, would like to speak to [facility]. Explained in clinic at this time, will call pt when available.

## 2022-11-09 ENCOUNTER — TELEPHONE (OUTPATIENT)
Dept: FAMILY MEDICINE CLINIC | Facility: CLINIC | Age: 72
End: 2022-11-09

## 2022-11-09 ENCOUNTER — TELEPHONE (OUTPATIENT)
Dept: RHEUMATOLOGY | Facility: CLINIC | Age: 72
End: 2022-11-09

## 2022-11-09 RX ORDER — CODEINE PHOSPHATE AND GUAIFENESIN 10; 100 MG/5ML; MG/5ML
5 SOLUTION ORAL EVERY 6 HOURS PRN
Qty: 180 ML | Refills: 0 | Status: SHIPPED | OUTPATIENT
Start: 2022-11-09

## 2022-11-09 NOTE — TELEPHONE ENCOUNTER
[place] ([facility]) [phone] (Home)     LM advising med sent and to call with worsening symptoms.

## 2022-11-09 NOTE — TELEPHONE ENCOUNTER
Spoke with patient for update:   Cough is a little deeper than usual, cough is not all day- more often than usual.   Requesting: Cough syrup, discussed not able to take the medication an operate machinery, patient agreed. Still using breztri - ordered through pulmo  No fever, no achy, not sick. Last OV: 8/8/22    LR, are you willing to fill the cough syrup?

## 2022-11-09 NOTE — TELEPHONE ENCOUNTER
Pt said her cough (COVID 3 mos ago) has started again. Asking for cough med refill.   pls advise if appt necessary (no appts avail this week)

## 2022-11-15 ENCOUNTER — OFFICE VISIT (OUTPATIENT)
Facility: LOCATION | Age: 72
End: 2022-11-15
Payer: COMMERCIAL

## 2022-11-15 DIAGNOSIS — R49.0 HOARSENESS: ICD-10-CM

## 2022-11-15 DIAGNOSIS — J31.0 RHINITIS, NON-ALLERGIC: Primary | ICD-10-CM

## 2022-11-15 PROCEDURE — 31575 DIAGNOSTIC LARYNGOSCOPY: CPT | Performed by: OTOLARYNGOLOGY

## 2022-11-15 PROCEDURE — 99214 OFFICE O/P EST MOD 30 MIN: CPT | Performed by: OTOLARYNGOLOGY

## 2022-11-15 RX ORDER — IPRATROPIUM BROMIDE 42 UG/1
2 SPRAY, METERED NASAL 2 TIMES DAILY
Qty: 1 EACH | Refills: 5 | Status: SHIPPED | OUTPATIENT
Start: 2022-11-15

## 2022-11-18 ENCOUNTER — APPOINTMENT (OUTPATIENT)
Dept: ULTRASOUND IMAGING | Age: 72
End: 2022-11-18
Attending: EMERGENCY MEDICINE
Payer: COMMERCIAL

## 2022-11-18 ENCOUNTER — HOSPITAL ENCOUNTER (EMERGENCY)
Age: 72
Discharge: HOME | End: 2022-11-18
Attending: EMERGENCY MEDICINE
Payer: COMMERCIAL

## 2022-11-18 VITALS
WEIGHT: 160 LBS | TEMPERATURE: 97 F | DIASTOLIC BLOOD PRESSURE: 57 MMHG | BODY MASS INDEX: 26.66 KG/M2 | SYSTOLIC BLOOD PRESSURE: 132 MMHG | HEART RATE: 76 BPM | RESPIRATION RATE: 12 BRPM | HEIGHT: 65 IN | OXYGEN SATURATION: 100 %

## 2022-11-18 DIAGNOSIS — S89.90XA INJURY OF CALF: Primary | ICD-10-CM

## 2022-11-18 PROCEDURE — 99284 EMERGENCY DEPT VISIT MOD MDM: CPT

## 2022-11-18 PROCEDURE — 93971 EXTREMITY STUDY: CPT | Performed by: EMERGENCY MEDICINE

## 2022-11-18 RX ORDER — HYDROCODONE BITARTRATE AND ACETAMINOPHEN 5; 325 MG/1; MG/1
1 TABLET ORAL ONCE
Status: COMPLETED | OUTPATIENT
Start: 2022-11-18 | End: 2022-11-18

## 2022-11-18 RX ORDER — HYDROCODONE BITARTRATE AND ACETAMINOPHEN 5; 325 MG/1; MG/1
1 TABLET ORAL EVERY 8 HOURS PRN
Qty: 10 TABLET | Refills: 0 | Status: SHIPPED | OUTPATIENT
Start: 2022-11-18 | End: 2022-11-23

## 2022-12-14 ENCOUNTER — TELEPHONE (OUTPATIENT)
Dept: RHEUMATOLOGY | Facility: CLINIC | Age: 72
End: 2022-12-14

## 2022-12-15 NOTE — TELEPHONE ENCOUNTER
Called patient to notify her that we are working with billing and awaiting to hear an update from them regarding her bill. LMTCB.

## 2022-12-15 NOTE — TELEPHONE ENCOUNTER
Pt phoned office, received a bill form [facility] for $061 from 9/21 blood work with Dr. Temitope Jones.  States the bill has come down, but requesting this is taken care of

## 2022-12-19 ENCOUNTER — TELEPHONE (OUTPATIENT)
Dept: RHEUMATOLOGY | Facility: CLINIC | Age: 72
End: 2022-12-19

## 2022-12-19 NOTE — TELEPHONE ENCOUNTER
Pt phoned office, will be having blood work completed prior to her upcoming appt. Will need to reach out to Dr. Patsy Ganser regarding orders.      Future Appointments   Date Time Provider Adina Barkley   12/20/2022 10:00 AM [facility] [address] [place] [address]   1/26/2023 10:30 AM Janiya Odell MD [facility] [address] [facility] [address]

## 2022-12-20 ENCOUNTER — LAB ENCOUNTER (OUTPATIENT)
Dept: LAB | Age: 72
End: 2022-12-20
Attending: INTERNAL MEDICINE
Payer: COMMERCIAL

## 2022-12-20 DIAGNOSIS — R76.8 POSITIVE ANA (ANTINUCLEAR ANTIBODY): ICD-10-CM

## 2022-12-20 DIAGNOSIS — Q99.8 MYOSITIS ASSOCIATED ANTIBODY POSITIVE: ICD-10-CM

## 2022-12-20 PROCEDURE — 83516 IMMUNOASSAY NONANTIBODY: CPT | Performed by: INTERNAL MEDICINE

## 2022-12-20 PROCEDURE — 86235 NUCLEAR ANTIGEN ANTIBODY: CPT | Performed by: INTERNAL MEDICINE

## 2023-01-05 LAB
EJ (GLYCYL - TRNA SYNTHETASE) ANTIBODY: NEGATIVE
FIBRILLARIN (U3 RNP) AB, IGG: NEGATIVE
JO-1 (HISTIDY1-TRNA SYNTHETASE) AB, IGG: 2 AU/ML
KU ANTIBODY: POSITIVE
MDA5 (CADM-140) ANTIBODY: NEGATIVE
MI-2 (NUCLEAR HELICASE PROTEIN) ANTIBODY: NEGATIVE
NXP-2 (NUCLEAR MATRIX PROTEIN-2) AB: NEGATIVE
OJ (ISOLEUCYL-TRNA SYNTHETASE) ANTIBODY: NEGATIVE
P155/140 (TIF-1 GAMMA) ANTIBODY: NEGATIVE
PL-7 (THREONYL-TRNA SYNTHETASE) ANTIBODY: NEGATIVE
PM_SCL 100 ANTIBODY, IGG: NEGATIVE
RIBONUCLEIC PROTEIN (U1) (ENA) AB, IGG: 2 UNITS
SAE1 (SUMO ACTIVATING ENZYME) ANTIBODY: NEGATIVE
SRP (SINGLE RECOGNITION PARTICLE) AB: NEGATIVE
SSA 52 (RO) (ENA) ANTIBODY, IGG: 4 AU/ML
SSA 60 (RO) (ENA) ANTIBODY, IGG: 2 AU/ML
TIF1-GAMMA ANTIBODY: NEGATIVE

## 2023-01-19 ENCOUNTER — TELEPHONE (OUTPATIENT)
Dept: RHEUMATOLOGY | Facility: CLINIC | Age: 73
End: 2023-01-19

## 2023-01-19 NOTE — TELEPHONE ENCOUNTER
Left voicemail, patient has no balance. If patient calls back, please notify her that per billing, balance has been waived and all codes are updated accordingly.

## 2023-01-26 ENCOUNTER — OFFICE VISIT (OUTPATIENT)
Dept: FAMILY MEDICINE CLINIC | Facility: CLINIC | Age: 73
End: 2023-01-26
Payer: COMMERCIAL

## 2023-01-26 ENCOUNTER — LAB ENCOUNTER (OUTPATIENT)
Dept: LAB | Age: 73
End: 2023-01-26
Attending: FAMILY MEDICINE
Payer: COMMERCIAL

## 2023-01-26 VITALS
HEIGHT: 65 IN | OXYGEN SATURATION: 98 % | HEART RATE: 75 BPM | DIASTOLIC BLOOD PRESSURE: 64 MMHG | SYSTOLIC BLOOD PRESSURE: 118 MMHG | RESPIRATION RATE: 16 BRPM | WEIGHT: 158 LBS | BODY MASS INDEX: 26.33 KG/M2

## 2023-01-26 DIAGNOSIS — E78.00 PURE HYPERCHOLESTEROLEMIA: ICD-10-CM

## 2023-01-26 DIAGNOSIS — Z00.00 ROUTINE GENERAL MEDICAL EXAMINATION AT A HEALTH CARE FACILITY: Primary | ICD-10-CM

## 2023-01-26 DIAGNOSIS — R76.8 POSITIVE ANA (ANTINUCLEAR ANTIBODY): ICD-10-CM

## 2023-01-26 DIAGNOSIS — R79.82 ELEVATED C-REACTIVE PROTEIN (CRP): ICD-10-CM

## 2023-01-26 DIAGNOSIS — Z00.00 ROUTINE GENERAL MEDICAL EXAMINATION AT A HEALTH CARE FACILITY: ICD-10-CM

## 2023-01-26 DIAGNOSIS — M79.10 MYALGIA: ICD-10-CM

## 2023-01-26 DIAGNOSIS — R91.8 GROUND GLASS OPACITY PRESENT ON IMAGING OF LUNG: ICD-10-CM

## 2023-01-26 DIAGNOSIS — Z12.31 ENCOUNTER FOR SCREENING MAMMOGRAM FOR MALIGNANT NEOPLASM OF BREAST: ICD-10-CM

## 2023-01-26 PROBLEM — R74.01 ELEVATED ALT MEASUREMENT: Status: RESOLVED | Noted: 2021-04-26 | Resolved: 2023-01-26

## 2023-01-26 PROBLEM — Z86.010 HISTORY OF ADENOMATOUS POLYP OF COLON: Status: RESOLVED | Noted: 2021-04-19 | Resolved: 2023-01-26

## 2023-01-26 PROBLEM — Z86.0101 HISTORY OF ADENOMATOUS POLYP OF COLON: Status: RESOLVED | Noted: 2021-04-19 | Resolved: 2023-01-26

## 2023-01-26 LAB
ALBUMIN SERPL-MCNC: 3.1 G/DL (ref 3.4–5)
ALBUMIN/GLOB SERPL: 0.6 {RATIO} (ref 1–2)
ALP LIVER SERPL-CCNC: 92 U/L
ALT SERPL-CCNC: 26 U/L
ANION GAP SERPL CALC-SCNC: 4 MMOL/L (ref 0–18)
AST SERPL-CCNC: 27 U/L (ref 15–37)
BILIRUB SERPL-MCNC: 0.6 MG/DL (ref 0.1–2)
BUN BLD-MCNC: 19 MG/DL (ref 7–18)
CALCIUM BLD-MCNC: 9.1 MG/DL (ref 8.5–10.1)
CHLORIDE SERPL-SCNC: 111 MMOL/L (ref 98–112)
CHOLEST SERPL-MCNC: 119 MG/DL (ref ?–200)
CO2 SERPL-SCNC: 25 MMOL/L (ref 21–32)
CREAT BLD-MCNC: 1.19 MG/DL
CRP SERPL-MCNC: <0.29 MG/DL (ref ?–0.3)
ERYTHROCYTE [SEDIMENTATION RATE] IN BLOOD: 63 MM/HR
FASTING PATIENT LIPID ANSWER: YES
FASTING STATUS PATIENT QL REPORTED: YES
GFR SERPLBLD BASED ON 1.73 SQ M-ARVRAT: 48 ML/MIN/1.73M2 (ref 60–?)
GLOBULIN PLAS-MCNC: 5.1 G/DL (ref 2.8–4.4)
GLUCOSE BLD-MCNC: 97 MG/DL (ref 70–99)
HDLC SERPL-MCNC: 60 MG/DL (ref 40–59)
LDLC SERPL CALC-MCNC: 40 MG/DL (ref ?–100)
MAGNESIUM SERPL-MCNC: 2.3 MG/DL (ref 1.6–2.6)
NONHDLC SERPL-MCNC: 59 MG/DL (ref ?–130)
OSMOLALITY SERPL CALC.SUM OF ELEC: 292 MOSM/KG (ref 275–295)
PHOSPHATE SERPL-MCNC: 3.3 MG/DL (ref 2.5–4.9)
POTASSIUM SERPL-SCNC: 4.7 MMOL/L (ref 3.5–5.1)
PROT SERPL-MCNC: 8.2 G/DL (ref 6.4–8.2)
SODIUM SERPL-SCNC: 140 MMOL/L (ref 136–145)
TRIGL SERPL-MCNC: 105 MG/DL (ref 30–149)
VLDLC SERPL CALC-MCNC: 14 MG/DL (ref 0–30)

## 2023-01-26 PROCEDURE — 80061 LIPID PANEL: CPT | Performed by: FAMILY MEDICINE

## 2023-01-26 PROCEDURE — 3074F SYST BP LT 130 MM HG: CPT | Performed by: FAMILY MEDICINE

## 2023-01-26 PROCEDURE — 83735 ASSAY OF MAGNESIUM: CPT | Performed by: FAMILY MEDICINE

## 2023-01-26 PROCEDURE — 84100 ASSAY OF PHOSPHORUS: CPT | Performed by: FAMILY MEDICINE

## 2023-01-26 PROCEDURE — 80053 COMPREHEN METABOLIC PANEL: CPT | Performed by: FAMILY MEDICINE

## 2023-01-26 PROCEDURE — 86140 C-REACTIVE PROTEIN: CPT | Performed by: FAMILY MEDICINE

## 2023-01-26 PROCEDURE — 3078F DIAST BP <80 MM HG: CPT | Performed by: FAMILY MEDICINE

## 2023-01-26 PROCEDURE — 85652 RBC SED RATE AUTOMATED: CPT | Performed by: FAMILY MEDICINE

## 2023-01-26 PROCEDURE — 3008F BODY MASS INDEX DOCD: CPT | Performed by: FAMILY MEDICINE

## 2023-01-26 PROCEDURE — 99397 PER PM REEVAL EST PAT 65+ YR: CPT | Performed by: FAMILY MEDICINE

## 2023-02-06 ENCOUNTER — TELEPHONE (OUTPATIENT)
Dept: FAMILY MEDICINE CLINIC | Facility: CLINIC | Age: 73
End: 2023-02-06

## 2023-02-06 ENCOUNTER — TELEPHONE (OUTPATIENT)
Dept: RHEUMATOLOGY | Facility: CLINIC | Age: 73
End: 2023-02-06

## 2023-02-06 NOTE — TELEPHONE ENCOUNTER
Pt called office, states she has not received test results from December. Pt had myositis panel completed. Pt has also had blood work completed from PCP, and states there is some inflammation. Would like to know if she should make a fu visit with Dr. Emeka Castro to see. Pt ok if you leave a VM on her phone.

## 2023-02-06 NOTE — TELEPHONE ENCOUNTER
Spoke with patient regarding results. Verbalized understanding.    At this time she is declining seeing her rheumatologist for a follow  up

## 2023-02-06 NOTE — TELEPHONE ENCOUNTER
Called pt. Left VM. Unread 1/15/2023 [employer] message. Please call patient tomorrow (2/7/2023) and read her my [employer] msg and get her response.

## 2023-02-07 ENCOUNTER — TELEPHONE (OUTPATIENT)
Dept: RHEUMATOLOGY | Facility: CLINIC | Age: 73
End: 2023-02-07

## 2023-02-07 DIAGNOSIS — J84.9 ILD (INTERSTITIAL LUNG DISEASE) (HCC): Primary | ICD-10-CM

## 2023-03-22 ENCOUNTER — TELEMEDICINE (OUTPATIENT)
Dept: FAMILY MEDICINE CLINIC | Facility: CLINIC | Age: 73
End: 2023-03-22
Payer: COMMERCIAL

## 2023-03-22 DIAGNOSIS — R05.3 CHRONIC COUGH: Primary | ICD-10-CM

## 2023-03-22 PROCEDURE — 99213 OFFICE O/P EST LOW 20 MIN: CPT | Performed by: PHYSICIAN ASSISTANT

## 2023-03-22 RX ORDER — CODEINE PHOSPHATE AND GUAIFENESIN 10; 100 MG/5ML; MG/5ML
5 SOLUTION ORAL EVERY 6 HOURS PRN
Qty: 236 ML | Refills: 0 | Status: SHIPPED | OUTPATIENT
Start: 2023-03-22

## 2023-03-22 RX ORDER — BENZONATATE 200 MG/1
200 CAPSULE ORAL 3 TIMES DAILY PRN
Qty: 30 CAPSULE | Refills: 0 | Status: SHIPPED | OUTPATIENT
Start: 2023-03-22

## 2023-03-22 NOTE — PROGRESS NOTES
Virtual Telephone Check-In    James Hurley verbally consents to a Virtual/Telephone Check-In visit on 03/22/23. Patient has been referred to the [facility] website at [email or web address] to review the yearly Consent to Treat document. Patient understands and accepts financial responsibility for any deductible, co-insurance and/or co-pays associated with this service. Duration of the service: 10 minutes      Summary of topics discussed:   Patient presents c/o URI symptoms for the last few days  Works with the public at [employer]  Has a cough, sore throat  Having some urine leakage from coughing so much  Negative covid at home  She feels chest congestion but cannot cough out her phlegm very easily  She denies SOB and wheezing  Her O2 sat is in 90s at home  Has not needed to use her breztri or albuterol inhalers  Appetite is decreased but chronic  No fever/chills  Tried robitussin DM with little relief    1. Chronic cough  Likely viral or related to her chronic lung disease. Will give cough medications which have helped in the past. Recommend she contact office in a couple days with condition update. If symptoms worsen or do not improve, then we can consider antibiotic.   - guaiFENesin-codeine (CHERATUSSIN AC) 100-10 MG/5ML Oral Solution; Take 5 mL by mouth every 6 (six) hours as needed for cough or congestion. Dispense: 236 mL; Refill: 0  - benzonatate 200 MG Oral Cap; Take 1 capsule (200 mg total) by mouth 3 (three) times daily as needed for cough. Dispense: 30 capsule;  Refill: 0        Kindra Arevalo PA-C

## 2023-03-27 ENCOUNTER — TELEPHONE (OUTPATIENT)
Dept: FAMILY MEDICINE CLINIC | Facility: CLINIC | Age: 73
End: 2023-03-27

## 2023-03-27 NOTE — TELEPHONE ENCOUNTER
PT was told to update in a week if not feeling better. States she is still not well and would like a call back.     [id number] - [place], [address] [id number], [address]   Phone: [phone] Fax: [phone]

## 2023-03-27 NOTE — TELEPHONE ENCOUNTER
Pt states still with cough, little better,   Yellow nasal discharge, low energy  covid negative at home test  Also needs note to be off work, has been off for 4 days, may need to be off rest of week

## 2023-03-27 NOTE — TELEPHONE ENCOUNTER
Per office note:  Recommend she contact office in a couple days with condition update. If symptoms worsen or do not improve, then we can consider antibiotic    LM to call back to review symptoms    Do you want to prescribe antibiotic?

## 2023-03-28 NOTE — TELEPHONE ENCOUNTER
Patient calling requesting a doctors note. She has been off since last Tuesday. Would need note to cover through yesterday. If a note cant be done today, they it would need to include today as well. Please advise.

## 2023-03-29 ENCOUNTER — HOSPITAL ENCOUNTER (OUTPATIENT)
Dept: MAMMOGRAPHY | Age: 73
Discharge: HOME | End: 2023-03-29
Attending: FAMILY MEDICINE
Payer: COMMERCIAL

## 2023-03-29 ENCOUNTER — HOSPITAL ENCOUNTER (OUTPATIENT)
Dept: CT IMAGING | Age: 73
Discharge: HOME | End: 2023-03-29
Attending: INTERNAL MEDICINE
Payer: COMMERCIAL

## 2023-03-29 DIAGNOSIS — Z12.31 ENCOUNTER FOR SCREENING MAMMOGRAM FOR MALIGNANT NEOPLASM OF BREAST: ICD-10-CM

## 2023-03-29 DIAGNOSIS — J84.9 ILD (INTERSTITIAL LUNG DISEASE) (HCC): ICD-10-CM

## 2023-03-29 PROCEDURE — 77063 BREAST TOMOSYNTHESIS BI: CPT | Performed by: FAMILY MEDICINE

## 2023-03-29 PROCEDURE — 71250 CT THORAX DX C-: CPT | Performed by: INTERNAL MEDICINE

## 2023-03-29 PROCEDURE — 77067 SCR MAMMO BI INCL CAD: CPT | Performed by: FAMILY MEDICINE

## 2023-04-06 ENCOUNTER — OFFICE VISIT (OUTPATIENT)
Dept: FAMILY MEDICINE CLINIC | Facility: CLINIC | Age: 73
End: 2023-04-06
Payer: COMMERCIAL

## 2023-04-06 VITALS
WEIGHT: 158 LBS | OXYGEN SATURATION: 97 % | DIASTOLIC BLOOD PRESSURE: 68 MMHG | BODY MASS INDEX: 26.33 KG/M2 | HEART RATE: 79 BPM | RESPIRATION RATE: 16 BRPM | SYSTOLIC BLOOD PRESSURE: 112 MMHG | HEIGHT: 65 IN

## 2023-04-06 DIAGNOSIS — L03.032 PARONYCHIA OF GREAT TOE OF LEFT FOOT: ICD-10-CM

## 2023-04-06 DIAGNOSIS — R05.3 CHRONIC COUGH: Primary | ICD-10-CM

## 2023-04-06 PROCEDURE — 3074F SYST BP LT 130 MM HG: CPT | Performed by: PHYSICIAN ASSISTANT

## 2023-04-06 PROCEDURE — 99213 OFFICE O/P EST LOW 20 MIN: CPT | Performed by: PHYSICIAN ASSISTANT

## 2023-04-06 PROCEDURE — 3078F DIAST BP <80 MM HG: CPT | Performed by: PHYSICIAN ASSISTANT

## 2023-04-06 PROCEDURE — 3008F BODY MASS INDEX DOCD: CPT | Performed by: PHYSICIAN ASSISTANT

## 2023-04-06 RX ORDER — BENZONATATE 200 MG/1
200 CAPSULE ORAL 3 TIMES DAILY PRN
Qty: 30 CAPSULE | Refills: 0 | Status: SHIPPED | OUTPATIENT
Start: 2023-04-06

## 2023-04-06 RX ORDER — CODEINE PHOSPHATE AND GUAIFENESIN 10; 100 MG/5ML; MG/5ML
5 SOLUTION ORAL EVERY 6 HOURS PRN
Qty: 180 ML | Refills: 0 | Status: SHIPPED | OUTPATIENT
Start: 2023-04-06

## 2023-05-15 ENCOUNTER — TELEPHONE (OUTPATIENT)
Dept: RHEUMATOLOGY | Facility: CLINIC | Age: 73
End: 2023-05-15

## 2023-05-16 ENCOUNTER — OFFICE VISIT (OUTPATIENT)
Dept: RHEUMATOLOGY | Facility: CLINIC | Age: 73
End: 2023-05-16
Payer: COMMERCIAL

## 2023-05-16 VITALS
DIASTOLIC BLOOD PRESSURE: 60 MMHG | HEART RATE: 73 BPM | HEIGHT: 65 IN | OXYGEN SATURATION: 97 % | WEIGHT: 160 LBS | TEMPERATURE: 97 F | BODY MASS INDEX: 26.66 KG/M2 | SYSTOLIC BLOOD PRESSURE: 110 MMHG | RESPIRATION RATE: 16 BRPM

## 2023-05-16 DIAGNOSIS — L60.3 NAIL DYSTROPHY: ICD-10-CM

## 2023-05-16 DIAGNOSIS — Z11.1 SCREENING FOR TUBERCULOSIS: ICD-10-CM

## 2023-05-16 DIAGNOSIS — Q99.8 MYOSITIS ASSOCIATED ANTIBODY POSITIVE: Primary | ICD-10-CM

## 2023-05-16 DIAGNOSIS — Z11.59 NEED FOR HEPATITIS C SCREENING TEST: ICD-10-CM

## 2023-05-16 DIAGNOSIS — J84.9 ILD (INTERSTITIAL LUNG DISEASE) (HCC): ICD-10-CM

## 2023-05-16 DIAGNOSIS — Z11.59 NEED FOR HEPATITIS B SCREENING TEST: ICD-10-CM

## 2023-05-16 DIAGNOSIS — Z51.81 THERAPEUTIC DRUG MONITORING: ICD-10-CM

## 2023-05-16 PROCEDURE — 99214 OFFICE O/P EST MOD 30 MIN: CPT | Performed by: INTERNAL MEDICINE

## 2023-05-16 PROCEDURE — 3078F DIAST BP <80 MM HG: CPT | Performed by: INTERNAL MEDICINE

## 2023-05-16 PROCEDURE — 3074F SYST BP LT 130 MM HG: CPT | Performed by: INTERNAL MEDICINE

## 2023-05-16 PROCEDURE — 3008F BODY MASS INDEX DOCD: CPT | Performed by: INTERNAL MEDICINE

## 2023-06-06 ENCOUNTER — OFFICE VISIT (OUTPATIENT)
Facility: LOCATION | Age: 73
End: 2023-06-06
Payer: COMMERCIAL

## 2023-06-06 DIAGNOSIS — J31.0 RHINITIS, NON-ALLERGIC: ICD-10-CM

## 2023-06-06 DIAGNOSIS — R49.0 HOARSENESS: Primary | ICD-10-CM

## 2023-06-06 PROCEDURE — 31575 DIAGNOSTIC LARYNGOSCOPY: CPT | Performed by: OTOLARYNGOLOGY

## 2023-06-06 PROCEDURE — 99214 OFFICE O/P EST MOD 30 MIN: CPT | Performed by: OTOLARYNGOLOGY

## 2023-08-30 ENCOUNTER — LAB ENCOUNTER (OUTPATIENT)
Dept: LAB | Age: 73
End: 2023-08-30
Attending: INTERNAL MEDICINE
Payer: COMMERCIAL

## 2023-08-30 DIAGNOSIS — Q99.8 MYOSITIS ASSOCIATED ANTIBODY POSITIVE: ICD-10-CM

## 2023-08-30 DIAGNOSIS — Z11.1 SCREENING FOR TUBERCULOSIS: ICD-10-CM

## 2023-08-30 DIAGNOSIS — Z11.59 NEED FOR HEPATITIS C SCREENING TEST: ICD-10-CM

## 2023-08-30 DIAGNOSIS — Z11.59 NEED FOR HEPATITIS B SCREENING TEST: ICD-10-CM

## 2023-08-30 LAB
ALBUMIN SERPL-MCNC: 2.9 G/DL (ref 3.4–5)
ALBUMIN/GLOB SERPL: 0.7 {RATIO} (ref 1–2)
ALP LIVER SERPL-CCNC: 92 U/L
ALT SERPL-CCNC: 23 U/L
ANION GAP SERPL CALC-SCNC: 3 MMOL/L (ref 0–18)
AST SERPL-CCNC: 19 U/L (ref 15–37)
BASOPHILS # BLD AUTO: 0.08 X10(3) UL (ref 0–0.2)
BASOPHILS NFR BLD AUTO: 1.3 %
BILIRUB SERPL-MCNC: 0.5 MG/DL (ref 0.1–2)
BUN BLD-MCNC: 23 MG/DL (ref 7–18)
CALCIUM BLD-MCNC: 8.6 MG/DL (ref 8.5–10.1)
CHLORIDE SERPL-SCNC: 113 MMOL/L (ref 98–112)
CK SERPL-CCNC: 86 U/L
CO2 SERPL-SCNC: 25 MMOL/L (ref 21–32)
CREAT BLD-MCNC: 1.02 MG/DL
CRP SERPL-MCNC: 0.37 MG/DL (ref ?–0.3)
EGFRCR SERPLBLD CKD-EPI 2021: 58 ML/MIN/1.73M2 (ref 60–?)
EOSINOPHIL # BLD AUTO: 0.37 X10(3) UL (ref 0–0.7)
EOSINOPHIL NFR BLD AUTO: 6 %
ERYTHROCYTE [DISTWIDTH] IN BLOOD BY AUTOMATED COUNT: 12.8 %
ERYTHROCYTE [SEDIMENTATION RATE] IN BLOOD: 42 MM/HR
FASTING STATUS PATIENT QL REPORTED: YES
GLOBULIN PLAS-MCNC: 4.4 G/DL (ref 2.8–4.4)
GLUCOSE BLD-MCNC: 97 MG/DL (ref 70–99)
HBV CORE AB SERPL QL IA: NONREACTIVE
HBV SURFACE AB SER QL: NONREACTIVE
HBV SURFACE AB SERPL IA-ACNC: <3.1 MIU/ML
HCT VFR BLD AUTO: 38.2 %
HCV AB SERPL QL IA: NONREACTIVE
HGB BLD-MCNC: 12.1 G/DL
IMM GRANULOCYTES # BLD AUTO: 0.02 X10(3) UL (ref 0–1)
IMM GRANULOCYTES NFR BLD: 0.3 %
LYMPHOCYTES # BLD AUTO: 2.06 X10(3) UL (ref 1–4)
LYMPHOCYTES NFR BLD AUTO: 33.4 %
MCH RBC QN AUTO: 30.4 PG (ref 26–34)
MCHC RBC AUTO-ENTMCNC: 31.7 G/DL (ref 31–37)
MCV RBC AUTO: 96 FL
MONOCYTES # BLD AUTO: 0.52 X10(3) UL (ref 0.1–1)
MONOCYTES NFR BLD AUTO: 8.4 %
NEUTROPHILS # BLD AUTO: 3.11 X10 (3) UL (ref 1.5–7.7)
NEUTROPHILS # BLD AUTO: 3.11 X10(3) UL (ref 1.5–7.7)
NEUTROPHILS NFR BLD AUTO: 50.6 %
OSMOLALITY SERPL CALC.SUM OF ELEC: 296 MOSM/KG (ref 275–295)
PLATELET # BLD AUTO: 238 10(3)UL (ref 150–450)
POTASSIUM SERPL-SCNC: 4.3 MMOL/L (ref 3.5–5.1)
PROT SERPL-MCNC: 7.3 G/DL (ref 6.4–8.2)
RBC # BLD AUTO: 3.98 X10(6)UL
SODIUM SERPL-SCNC: 141 MMOL/L (ref 136–145)
WBC # BLD AUTO: 6.2 X10(3) UL (ref 4–11)

## 2023-08-30 PROCEDURE — 86704 HEP B CORE ANTIBODY TOTAL: CPT

## 2023-08-30 PROCEDURE — 80053 COMPREHEN METABOLIC PANEL: CPT

## 2023-08-30 PROCEDURE — 87340 HEPATITIS B SURFACE AG IA: CPT

## 2023-08-30 PROCEDURE — 85025 COMPLETE CBC W/AUTO DIFF WBC: CPT

## 2023-08-30 PROCEDURE — 83516 IMMUNOASSAY NONANTIBODY: CPT

## 2023-08-30 PROCEDURE — 36415 COLL VENOUS BLD VENIPUNCTURE: CPT

## 2023-08-30 PROCEDURE — 86706 HEP B SURFACE ANTIBODY: CPT

## 2023-08-30 PROCEDURE — 85652 RBC SED RATE AUTOMATED: CPT

## 2023-08-30 PROCEDURE — 82550 ASSAY OF CK (CPK): CPT

## 2023-08-30 PROCEDURE — 86235 NUCLEAR ANTIGEN ANTIBODY: CPT

## 2023-08-30 PROCEDURE — 86140 C-REACTIVE PROTEIN: CPT

## 2023-08-30 PROCEDURE — 86480 TB TEST CELL IMMUN MEASURE: CPT

## 2023-08-30 PROCEDURE — 86803 HEPATITIS C AB TEST: CPT

## 2023-08-31 LAB
HBV SURFACE AG SER-ACNC: <0.1 [IU]/L
HBV SURFACE AG SERPL QL IA: NONREACTIVE
M TB IFN-G CD4+ T-CELLS BLD-ACNC: 0.1 IU/ML
M TB TUBERC IFN-G BLD QL: NEGATIVE
M TB TUBERC IGNF/MITOGEN IGNF CONTROL: >10 IU/ML
QFT TB1 AG MINUS NIL: 0.02 IU/ML
QFT TB2 AG MINUS NIL: 0 IU/ML

## 2023-09-19 ENCOUNTER — TELEPHONE (OUTPATIENT)
Dept: RHEUMATOLOGY | Facility: CLINIC | Age: 73
End: 2023-09-19

## 2023-09-19 LAB
ANTI-EJ: NEGATIVE
ANTI-JO-1: <20 UNITS
ANTI-KU: NEGATIVE
ANTI-MDA-5: <20 UNITS
ANTI-MI-2 AB: NEGATIVE
ANTI-NXP-2: <20 UNITS
ANTI-OJ: NEGATIVE
ANTI-PL-7: NEGATIVE
ANTI-PM/SCL100: <20 UNITS
ANTI-SAE1: <20 UNITS
ANTI-SRP AB: NEGATIVE
ANTI-SSA 52KD IGG: <20 UNITS
ANTI-TIF-1GAMMA: <20 UNITS
ANTI-U1 RNP: <20 UNITS
ANTI-U2 RNP: NEGATIVE
ANTI-U3 RNP: NEGATIVE

## 2023-09-19 NOTE — TELEPHONE ENCOUNTER
Pt called office, requesting lab results. Explained Dr. Martin Patel sent a [employer] message on 9/4 that labs stable/ good so far. Myositis still pending results. Pt voices understanding.

## 2023-09-27 ENCOUNTER — HOSPITAL ENCOUNTER (OUTPATIENT)
Dept: GENERAL RADIOLOGY | Age: 73
Discharge: HOME | End: 2023-09-27
Attending: FAMILY MEDICINE
Payer: COMMERCIAL

## 2023-09-27 ENCOUNTER — OFFICE VISIT (OUTPATIENT)
Dept: FAMILY MEDICINE CLINIC | Facility: CLINIC | Age: 73
End: 2023-09-27
Payer: COMMERCIAL

## 2023-09-27 VITALS
HEART RATE: 82 BPM | HEIGHT: 65 IN | WEIGHT: 155 LBS | OXYGEN SATURATION: 97 % | SYSTOLIC BLOOD PRESSURE: 118 MMHG | DIASTOLIC BLOOD PRESSURE: 60 MMHG | RESPIRATION RATE: 17 BRPM | BODY MASS INDEX: 25.83 KG/M2

## 2023-09-27 DIAGNOSIS — M16.11 PRIMARY OSTEOARTHRITIS OF RIGHT HIP: ICD-10-CM

## 2023-09-27 DIAGNOSIS — M16.11 PRIMARY OSTEOARTHRITIS OF RIGHT HIP: Primary | ICD-10-CM

## 2023-09-27 DIAGNOSIS — Z23 FLU VACCINE NEED: ICD-10-CM

## 2023-09-27 PROCEDURE — 90471 IMMUNIZATION ADMIN: CPT | Performed by: FAMILY MEDICINE

## 2023-09-27 PROCEDURE — 73502 X-RAY EXAM HIP UNI 2-3 VIEWS: CPT | Performed by: FAMILY MEDICINE

## 2023-09-27 PROCEDURE — 90662 IIV NO PRSV INCREASED AG IM: CPT | Performed by: FAMILY MEDICINE

## 2023-09-27 PROCEDURE — 3078F DIAST BP <80 MM HG: CPT | Performed by: FAMILY MEDICINE

## 2023-09-27 PROCEDURE — 99213 OFFICE O/P EST LOW 20 MIN: CPT | Performed by: FAMILY MEDICINE

## 2023-09-27 PROCEDURE — 3008F BODY MASS INDEX DOCD: CPT | Performed by: FAMILY MEDICINE

## 2023-09-27 PROCEDURE — 3074F SYST BP LT 130 MM HG: CPT | Performed by: FAMILY MEDICINE

## 2023-09-27 NOTE — PATIENT INSTRUCTIONS
Given the history, I suspect osteoarthritis of the hip joint. May also be trochanteric bursitis. You should try diclofenac gel several times a day, rub it into the skin right over the the hip/greater trochanter. The bony prominence you feel on the side of the thigh. Also could try capsaicin cream.  Both of these are over-the-counter.

## 2023-11-16 ENCOUNTER — OFFICE VISIT (OUTPATIENT)
Dept: RHEUMATOLOGY | Facility: CLINIC | Age: 73
End: 2023-11-16
Payer: COMMERCIAL

## 2023-11-16 VITALS
SYSTOLIC BLOOD PRESSURE: 136 MMHG | RESPIRATION RATE: 16 BRPM | TEMPERATURE: 97 F | WEIGHT: 158.63 LBS | HEIGHT: 65 IN | OXYGEN SATURATION: 92 % | HEART RATE: 72 BPM | BODY MASS INDEX: 26.43 KG/M2 | DIASTOLIC BLOOD PRESSURE: 60 MMHG

## 2023-11-16 DIAGNOSIS — Q99.8 MYOSITIS ASSOCIATED ANTIBODY POSITIVE: ICD-10-CM

## 2023-11-16 DIAGNOSIS — J84.9 ILD (INTERSTITIAL LUNG DISEASE) (HCC): Primary | ICD-10-CM

## 2023-11-16 PROCEDURE — 3078F DIAST BP <80 MM HG: CPT | Performed by: INTERNAL MEDICINE

## 2023-11-16 PROCEDURE — 99213 OFFICE O/P EST LOW 20 MIN: CPT | Performed by: INTERNAL MEDICINE

## 2023-11-16 PROCEDURE — 3008F BODY MASS INDEX DOCD: CPT | Performed by: INTERNAL MEDICINE

## 2023-11-16 PROCEDURE — 3075F SYST BP GE 130 - 139MM HG: CPT | Performed by: INTERNAL MEDICINE

## 2024-01-29 ENCOUNTER — OFFICE VISIT (OUTPATIENT)
Dept: FAMILY MEDICINE CLINIC | Facility: CLINIC | Age: 74
End: 2024-01-29
Payer: COMMERCIAL

## 2024-01-29 ENCOUNTER — LAB ENCOUNTER (OUTPATIENT)
Dept: LAB | Age: 74
End: 2024-01-29
Attending: FAMILY MEDICINE
Payer: COMMERCIAL

## 2024-01-29 VITALS
OXYGEN SATURATION: 96 % | RESPIRATION RATE: 15 BRPM | HEART RATE: 55 BPM | SYSTOLIC BLOOD PRESSURE: 120 MMHG | BODY MASS INDEX: 25.63 KG/M2 | HEIGHT: 65 IN | WEIGHT: 153.81 LBS | DIASTOLIC BLOOD PRESSURE: 60 MMHG

## 2024-01-29 DIAGNOSIS — Z00.00 ROUTINE GENERAL MEDICAL EXAMINATION AT A HEALTH CARE FACILITY: ICD-10-CM

## 2024-01-29 DIAGNOSIS — Z12.31 BREAST CANCER SCREENING BY MAMMOGRAM: ICD-10-CM

## 2024-01-29 DIAGNOSIS — E78.00 PURE HYPERCHOLESTEROLEMIA: ICD-10-CM

## 2024-01-29 DIAGNOSIS — Z00.00 ROUTINE GENERAL MEDICAL EXAMINATION AT A HEALTH CARE FACILITY: Primary | ICD-10-CM

## 2024-01-29 PROBLEM — Z51.81 THERAPEUTIC DRUG MONITORING: Status: RESOLVED | Noted: 2022-09-19 | Resolved: 2024-01-29

## 2024-01-29 LAB
ALBUMIN SERPL-MCNC: 3.4 G/DL (ref 3.4–5)
ALBUMIN/GLOB SERPL: 0.8 {RATIO} (ref 1–2)
ALP LIVER SERPL-CCNC: 98 U/L
ANION GAP SERPL CALC-SCNC: 4 MMOL/L (ref 0–18)
AST SERPL-CCNC: 25 U/L (ref 15–37)
BASOPHILS # BLD AUTO: 0.08 X10(3) UL (ref 0–0.2)
BASOPHILS NFR BLD AUTO: 1.2 %
BILIRUB SERPL-MCNC: 0.6 MG/DL (ref 0.1–2)
BUN BLD-MCNC: 21 MG/DL (ref 9–23)
CALCIUM BLD-MCNC: 9.4 MG/DL (ref 8.5–10.1)
CHLORIDE SERPL-SCNC: 111 MMOL/L (ref 98–112)
CHOLEST SERPL-MCNC: 135 MG/DL (ref ?–200)
CO2 SERPL-SCNC: 29 MMOL/L (ref 21–32)
CREAT BLD-MCNC: 1.12 MG/DL
EGFRCR SERPLBLD CKD-EPI 2021: 52 ML/MIN/1.73M2 (ref 60–?)
EOSINOPHIL # BLD AUTO: 0.2 X10(3) UL (ref 0–0.7)
EOSINOPHIL NFR BLD AUTO: 3 %
ERYTHROCYTE [DISTWIDTH] IN BLOOD BY AUTOMATED COUNT: 13.4 %
FASTING PATIENT LIPID ANSWER: YES
FASTING STATUS PATIENT QL REPORTED: YES
GLOBULIN PLAS-MCNC: 4.3 G/DL (ref 2.8–4.4)
GLUCOSE BLD-MCNC: 98 MG/DL (ref 70–99)
HCT VFR BLD AUTO: 39.8 %
HDLC SERPL-MCNC: 63 MG/DL (ref 40–59)
HGB BLD-MCNC: 12.4 G/DL
IMM GRANULOCYTES # BLD AUTO: 0.01 X10(3) UL (ref 0–1)
IMM GRANULOCYTES NFR BLD: 0.1 %
LDLC SERPL CALC-MCNC: 56 MG/DL (ref ?–100)
LYMPHOCYTES # BLD AUTO: 2.23 X10(3) UL (ref 1–4)
LYMPHOCYTES NFR BLD AUTO: 33.1 %
MCH RBC QN AUTO: 30.2 PG (ref 26–34)
MCHC RBC AUTO-ENTMCNC: 31.2 G/DL (ref 31–37)
MCV RBC AUTO: 97.1 FL
MONOCYTES # BLD AUTO: 0.58 X10(3) UL (ref 0.1–1)
MONOCYTES NFR BLD AUTO: 8.6 %
NEUTROPHILS # BLD AUTO: 3.64 X10 (3) UL (ref 1.5–7.7)
NEUTROPHILS # BLD AUTO: 3.64 X10(3) UL (ref 1.5–7.7)
NEUTROPHILS NFR BLD AUTO: 54 %
NONHDLC SERPL-MCNC: 72 MG/DL (ref ?–130)
OSMOLALITY SERPL CALC.SUM OF ELEC: 301 MOSM/KG (ref 275–295)
PLATELET # BLD AUTO: 213 10(3)UL (ref 150–450)
POTASSIUM SERPL-SCNC: 5.2 MMOL/L (ref 3.5–5.1)
PROT SERPL-MCNC: 7.7 G/DL (ref 6.4–8.2)
RBC # BLD AUTO: 4.1 X10(6)UL
SODIUM SERPL-SCNC: 144 MMOL/L (ref 136–145)
TRIGL SERPL-MCNC: 84 MG/DL (ref 30–149)
VLDLC SERPL CALC-MCNC: 12 MG/DL (ref 0–30)
WBC # BLD AUTO: 6.7 X10(3) UL (ref 4–11)

## 2024-01-29 PROCEDURE — 85025 COMPLETE CBC W/AUTO DIFF WBC: CPT

## 2024-01-29 PROCEDURE — 3074F SYST BP LT 130 MM HG: CPT | Performed by: FAMILY MEDICINE

## 2024-01-29 PROCEDURE — 3078F DIAST BP <80 MM HG: CPT | Performed by: FAMILY MEDICINE

## 2024-01-29 PROCEDURE — 3008F BODY MASS INDEX DOCD: CPT | Performed by: FAMILY MEDICINE

## 2024-01-29 PROCEDURE — 80053 COMPREHEN METABOLIC PANEL: CPT

## 2024-01-29 PROCEDURE — 80061 LIPID PANEL: CPT

## 2024-01-29 PROCEDURE — 99397 PER PM REEVAL EST PAT 65+ YR: CPT | Performed by: FAMILY MEDICINE

## 2024-01-29 NOTE — PROGRESS NOTES
HPI:  Here for a physical.    PAST MEDICAL HISTORY:  Past Medical History:   Diagnosis Date    Allergic rhinitis     Arthritis     Change in hair     Cholelithiasis 10/21/2016    Constipation     Current moderate episode of major depressive disorder without prior episode (HCC) 04/23/2018    Frequent urination     Heart attack (HCC) 1999    no intervention    Heartburn     High cholesterol     History of adenomatous polyp of colon 04/19/2021    History of depression     Hyperlipidemia     Recurrent UTI (urinary tract infection)     Sleep disturbance     Uncomfortable fullness after meals     Wears glasses      PAST SURGICAL HISTORY:  Past Surgical History:   Procedure Laterality Date    ANGIOGRAM      no stents    APPENDECTOMY  2005    CATARACT  2016    COLONOSCOPY  1/17/2011    Dr. Nielsen, 1 polyp    CYSTOSCOPY,INSERT URETERAL STENT  11/15/2016    stent removal 11/18/16. Re-inserted now remains right ureter    EYE SURGERY  2014    KNEE ARTHROSCOPY  1976 1982    LAPAROSCOPIC CHOLECYSTECTOMY N/A 12/9/2016    Procedure: LAPAROSCOPIC CHOLECYSTECTOMY;  Surgeon: Riccardo Paez MD;  Location: [facility] MAIN OR    LAPAROSCOPY, SURGICAL; COLECTOMY, PARTIAL, W/ ANASTOMOS      malignant polyp    KATIE BIOPSY STEREO NODULE 1 SITE RIGHT (CPT=19081) Right 2001    benign.    TUBAL LIGATION  1983     MEDICATIONS:  Current Outpatient Medications   Medication Sig Dispense Refill    MAGNESIUM OR Take by mouth.      atorvastatin 20 MG Oral Tab Take 1 tablet (20 mg total) by mouth daily. 90 tablet 3    Multiple Vitamins-Minerals (MULTIVITAL OR) Take 1 tablet by mouth daily.        aspirin 81 MG Oral Tab Take 1 tablet (81 mg total) by mouth daily.       ALLERGIES: Opioid analgesics, Sulfa antibiotics, Morphine, and Nitrofurantoin    Family History   Problem Relation Age of Onset    Cancer Mother         liver    Vitiligo Mother     Other (cirrhosis) Mother     Other (gallbladder attack) Mother     Heart Disease Father     Heart Surgery Father      Vitiligo Sister     Other (cholecystectomy) Sister     Hypertension Sister     Other (osteoarthritis knee) Sister     Heart Attack Paternal Grandfather     Other (multiple sclerosis) Daughter         diagnosed 2018    Alcohol and Other Disorders Associated Daughter     Depression Daughter        Social History:   Social History     Socioeconomic History    Marital status:      Spouse name: Not on file    Number of children: Not on file    Years of education: Not on file    Highest education level: Not on file   Occupational History    Not on file   Tobacco Use    Smoking status: Never    Smokeless tobacco: Never   Vaping Use    Vaping Use: Never used   Substance and Sexual Activity    Alcohol use: Yes     Comment: social    Drug use: No    Sexual activity: Not on file   Other Topics Concern    Caffeine Concern Not Asked    Exercise Not Asked    Seat Belt Not Asked    Special Diet Not Asked    Stress Concern Not Asked    Weight Concern Not Asked   Social History Narrative        4 children    Never smoked     at [employer]    Occasional EtOH    No drugs     Social Determinants of Health     Financial Resource Strain: Not on file   Food Insecurity: Not on file   Transportation Needs: Not on file   Physical Activity: Not on file   Stress: Not on file   Social Connections: Not on file   Housing Stability: Not on file       EXAM:  /60   Pulse 55   Resp 15   Ht 5' 5\" (1.651 m)   Wt 153 lb 12.8 oz (69.8 kg)   SpO2 96%   BMI 25.59 kg/m²   NAD  GENERAL: well developed, well nourished, in no apparent distress.  EARS: Bilateral pinna / tragus are normal in appearance. External canals patent and without inflammation. Bilateral tympanic membranes white. No effusions noted. Hearing grossly normal.  EYES: PERRLA, EOMI, bilateral sclera anicteric, non-injected. Conjunctiva pink..  OROPHARYNX: Mucosa moist without lesions. Dentition intact and gums appear healthy.  NECK: Supple, No lymphadenopathy. No  thyromegaly or lesions palpated.  CARDIOVASCULAR: RRR, NL S1 and S2, no murmurs. Bilateral dorsalis pedis and posterior tibial pulses 2+/4+. No edema of the lower extremities. No JVD noted.  PULMONARY: CTA bilaterally, good air exchange, no rhonchi, wheezes, or rales. No dullness to percussion.  BREASTS: no masses, no nipple discharge, no axillary nodes; normal skin.  Medical assistant Kamlesh present.  ABDOMEN: Soft, nontender, nondistended, NABS x 4 quadrants. No HSM; no masses; no bruits.   LYMPHATIC: no lymphadenopathy palpated about the anterior and posterior cervical chains, submandibular and supraclavicular areas, and inguinal areas.  EXTREMITIES / MUSCULOSKELETAL: 4 extremities with grossly normal ROM. Gait NL.  No cyanosis, clubbing or edema.  NEUROLOGIC: CN's II-XII grossly intact, DTR's 2+/4+ throughout. Strength 5+/5+ x 4 ext. Alert and orientated.  SKIN: no suspicions lesions noted.  PSYCHIATRIC: Mood and affect appropriate.    ASSESSMENT / PLAN:  Routine health  maintenence  Mammogram: Ordered  Labs ordered: Lipids CMP for hyperlipidemia.  CBC  Administer Tetanus, diphtheria, pertussis booster : 2019.  Shingles vaccine if over 50. 2 doses of Shingrix 2 to 6 months apart.  Less expensive to obtain from pharmacy.  Up-to-date  [facility] information discussed: Consider RSV vaccine in the fall.  Other vaccines up-to-date  Other issues: Hyperlipidemia: Fasting for labs.      Patient understood above plan and agreed to do labs within the next 30 days as well as to make all appointments for referrals if given within the next 30 days. Patient understands to contact office if unable to do so.

## 2024-02-07 ENCOUNTER — TELEPHONE (OUTPATIENT)
Dept: FAMILY MEDICINE CLINIC | Facility: CLINIC | Age: 74
End: 2024-02-07

## 2024-02-07 NOTE — TELEPHONE ENCOUNTER
Patient is calling to find out what her test results were that she had completed on 1/29/24.  She does not have MyChart.    States it is ok to leave detailed voice message.

## 2024-03-05 ENCOUNTER — HOSPITAL ENCOUNTER (OUTPATIENT)
Dept: CT IMAGING | Age: 74
Discharge: HOME | End: 2024-03-05
Attending: INTERNAL MEDICINE
Payer: COMMERCIAL

## 2024-03-05 DIAGNOSIS — J84.9 ILD (INTERSTITIAL LUNG DISEASE) (HCC): ICD-10-CM

## 2024-03-05 DIAGNOSIS — J47.9 BRONCHIECTASIS WITHOUT COMPLICATION (HCC): ICD-10-CM

## 2024-03-05 PROCEDURE — 71250 CT THORAX DX C-: CPT | Performed by: INTERNAL MEDICINE

## 2024-03-30 ENCOUNTER — HOSPITAL ENCOUNTER (OUTPATIENT)
Dept: MAMMOGRAPHY | Age: 74
Discharge: HOME | End: 2024-03-30
Attending: FAMILY MEDICINE
Payer: COMMERCIAL

## 2024-03-30 DIAGNOSIS — Z12.31 BREAST CANCER SCREENING BY MAMMOGRAM: ICD-10-CM

## 2024-03-30 PROCEDURE — 77067 SCR MAMMO BI INCL CAD: CPT | Performed by: FAMILY MEDICINE

## 2024-03-30 PROCEDURE — 77063 BREAST TOMOSYNTHESIS BI: CPT | Performed by: FAMILY MEDICINE

## 2024-05-15 ENCOUNTER — HOSPITAL ENCOUNTER (OUTPATIENT)
Dept: GENERAL RADIOLOGY | Age: 74
Discharge: HOME | End: 2024-05-15
Attending: FAMILY MEDICINE

## 2024-05-15 ENCOUNTER — OFFICE VISIT (OUTPATIENT)
Dept: FAMILY MEDICINE CLINIC | Facility: CLINIC | Age: 74
End: 2024-05-15

## 2024-05-15 VITALS
SYSTOLIC BLOOD PRESSURE: 118 MMHG | WEIGHT: 153 LBS | BODY MASS INDEX: 25.49 KG/M2 | DIASTOLIC BLOOD PRESSURE: 60 MMHG | OXYGEN SATURATION: 97 % | HEART RATE: 88 BPM | HEIGHT: 65 IN | RESPIRATION RATE: 15 BRPM

## 2024-05-15 DIAGNOSIS — M19.072 ARTHRITIS OF FIRST METATARSOPHALANGEAL (MTP) JOINT OF LEFT FOOT: ICD-10-CM

## 2024-05-15 DIAGNOSIS — M19.071 ARTHRITIS OF FIRST METATARSOPHALANGEAL (MTP) JOINT OF RIGHT FOOT: ICD-10-CM

## 2024-05-15 DIAGNOSIS — M19.072 ARTHRITIS OF FIRST METATARSOPHALANGEAL (MTP) JOINT OF LEFT FOOT: Primary | ICD-10-CM

## 2024-05-15 PROCEDURE — 73630 X-RAY EXAM OF FOOT: CPT | Performed by: FAMILY MEDICINE

## 2024-05-15 PROCEDURE — 3074F SYST BP LT 130 MM HG: CPT | Performed by: FAMILY MEDICINE

## 2024-05-15 PROCEDURE — 99213 OFFICE O/P EST LOW 20 MIN: CPT | Performed by: FAMILY MEDICINE

## 2024-05-15 PROCEDURE — 3008F BODY MASS INDEX DOCD: CPT | Performed by: FAMILY MEDICINE

## 2024-05-15 PROCEDURE — 3078F DIAST BP <80 MM HG: CPT | Performed by: FAMILY MEDICINE

## 2024-05-15 NOTE — PROGRESS NOTES
Was on a 2-week vacation.  Did a lot of walking.  Developed pain on the dorsum of the first MTP joint left greater than right.  The tops of the feet appeared bruised.  There was no redness warmth or swelling.  Feels a bit better today.  They have been home for several days.  Although she did work Sunday and Monday.  On her feet all day.    PAST MEDICAL HISTORY:  Past Medical History:    Allergic rhinitis    Arthritis    Change in hair    Cholelithiasis    Constipation    Current moderate episode of major depressive disorder without prior episode (HCC)    Frequent urination    Heart attack (HCC)    no intervention    Heartburn    High cholesterol    History of adenomatous polyp of colon    History of depression    Hyperlipidemia    Recurrent UTI (urinary tract infection)    Sleep disturbance    Uncomfortable fullness after meals    Wears glasses     PAST SURGICAL HISTORY:  Past Surgical History:   Procedure Laterality Date    Angiogram      no stents    Appendectomy  2005    Cataract  2016    Colonoscopy  1/17/2011    Dr. Nielsen, 1 polyp    Cystoscopy,insert ureteral stent  11/15/2016    stent removal 11/18/16. Re-inserted now remains right ureter    Eye surgery  2014    Knee arthroscopy  1976 1982    Laparoscopic cholecystectomy N/A 12/9/2016    Procedure: LAPAROSCOPIC CHOLECYSTECTOMY;  Surgeon: Riccardo Paez MD;  Location:  MAIN OR    Laparoscopy, surgical; colectomy, partial, w/ anastomos      malignant polyp    Emily biopsy stereo nodule 1 site right (cpt=19081) Right 2001    benign.    Tubal ligation  1983     MEDICATIONS:  Current Outpatient Medications   Medication Sig Dispense Refill    MAGNESIUM OR Take by mouth.      atorvastatin 20 MG Oral Tab Take 1 tablet (20 mg total) by mouth daily. 90 tablet 3    Multiple Vitamins-Minerals (MULTIVITAL OR) Take 1 tablet by mouth daily.        aspirin 81 MG Oral Tab Take 1 tablet (81 mg total) by mouth daily.       ALLERGIES:   Opioid analgesics, Sulfa antibiotics,  Morphine, and Nitrofurantoin  FAMILY HISTORY  Family History   Problem Relation Age of Onset    Cancer Mother         liver    Vitiligo Mother     Other (cirrhosis) Mother     Other (gallbladder attack) Mother     Heart Disease Father     Heart Surgery Father     Vitiligo Sister     Other (cholecystectomy) Sister     Hypertension Sister     Other (osteoarthritis knee) Sister     Heart Attack Paternal Grandfather     Other (multiple sclerosis) Daughter         diagnosed 2018    Alcohol and Other Disorders Associated Daughter     Depression Daughter        PHYSICAL EXAM:  /60   Pulse 88   Resp 15   Ht 5' 5\" (1.651 m)   Wt 153 lb (69.4 kg)   SpO2 97%   BMI 25.46 kg/m²     Alert no acute distress breathing comfortably.  Calf nontender.  Ankle full range of motion.  Feet are tender at the MTP joint actually the right was worse than the left.  There is a nodule on the dorsum of the left first MTP joint.    ASSESSMENT/PLAN:    1. Arthritis of first metatarsophalangeal (MTP) joint of left foot    - XR FOOT, COMPLETE (MIN 3 VIEWS), LEFT (CPT=73630); Future    2. Arthritis of first metatarsophalangeal (MTP) joint of right foot    - XR FOOT, COMPLETE (MIN 3 VIEWS), RIGHT (CPT=73630); Future     I recommended ice and ibuprofen 600 mg up to 3 times a day.    If this note is coded by time based on the Office/Outpatient Evaluation and Management Codes effective January 1, 2021, the time includes reviewing the chart before entering the exam room, the time spent with the patient in face to face discussion and examination, and the time documenting the visit.  It may also include time ordering tests or reviewing test results completed on the same day.

## 2024-05-22 ENCOUNTER — TELEPHONE (OUTPATIENT)
Dept: FAMILY MEDICINE CLINIC | Facility: CLINIC | Age: 74
End: 2024-05-22

## 2024-05-22 NOTE — TELEPHONE ENCOUNTER
Patient called stating that she received a call from our office and she would like to know her x-ray results from her left and right foot that was done on 05/15/24.     Patient also stated that nurse can leave the message on her voicemail do to its a private voicemail and she's the only one that have access to it. Please call Patient # [phone]     Please advise

## 2024-06-04 ENCOUNTER — OFFICE VISIT (OUTPATIENT)
Facility: LOCATION | Age: 74
End: 2024-06-04
Payer: COMMERCIAL

## 2024-06-04 DIAGNOSIS — R49.0 HOARSENESS: Primary | ICD-10-CM

## 2024-06-04 PROCEDURE — 31575 DIAGNOSTIC LARYNGOSCOPY: CPT | Performed by: OTOLARYNGOLOGY

## 2024-06-04 PROCEDURE — 99214 OFFICE O/P EST MOD 30 MIN: CPT | Performed by: OTOLARYNGOLOGY

## 2024-06-04 NOTE — PROGRESS NOTES
Grace Guy is a 74 year old female. No chief complaint on file.    HPI:   74-year-old white female we saw for hoarseness secondary to postnasal drip was placed on Atrovent nasal spray she improved with her hoarseness she is in just for check she is discontinued all meds at this time.  Current Outpatient Medications   Medication Sig Dispense Refill    MAGNESIUM OR Take by mouth.      atorvastatin 20 MG Oral Tab Take 1 tablet (20 mg total) by mouth daily. 90 tablet 3    Multiple Vitamins-Minerals (MULTIVITAL OR) Take 1 tablet by mouth daily.        aspirin 81 MG Oral Tab Take 1 tablet (81 mg total) by mouth daily.        Past Medical History:    Allergic rhinitis    Arthritis    Change in hair    Cholelithiasis    Constipation    Current moderate episode of major depressive disorder without prior episode (HCC)    Frequent urination    Heart attack (HCC)    no intervention    Heartburn    High cholesterol    History of adenomatous polyp of colon    History of depression    Hyperlipidemia    Recurrent UTI (urinary tract infection)    Sleep disturbance    Uncomfortable fullness after meals    Wears glasses      Social History:  Social History     Socioeconomic History    Marital status:    Tobacco Use    Smoking status: Never    Smokeless tobacco: Never   Vaping Use    Vaping status: Never Used   Substance and Sexual Activity    Alcohol use: Yes     Comment: social    Drug use: No   Social History Narrative        4 children    Never smoked     at [employer]    Occasional EtOH    No drugs     Social Determinants of Health      Received [facility]    Housing Stability      Past Surgical History:   Procedure Laterality Date    Angiogram      no stents    Appendectomy  2005    Cataract  2016    Colonoscopy  1/17/2011    Dr. Nielsen, 1 polyp    Cystoscopy,insert ureteral stent  11/15/2016    stent removal 11/18/16. Re-inserted now remains right ureter     Eye surgery  2014    Knee arthroscopy  1976 1982    Laparoscopic cholecystectomy N/A 12/9/2016    Procedure: LAPAROSCOPIC CHOLECYSTECTOMY;  Surgeon: Riccardo Paez MD;  Location:  MAIN OR    Laparoscopy, surgical; colectomy, partial, w/ anastomos      malignant polyp    Emily biopsy stereo nodule 1 site right (cpt=19081) Right 2001    benign.    Tubal ligation  1983         REVIEW OF SYSTEMS:   GENERAL HEALTH: feels well otherwise  GENERAL : denies fever, chills, sweats, weight loss, weight gain  SKIN: denies any unusual skin lesions or rashes  RESPIRATORY: denies shortness of breath with exertion  NEURO: denies headaches    EXAM:   There were no vitals taken for this visit.    System Pertinent findings Details   Constitutional  Overall appearance - Normal.   Head/Face  Facial features -- Normal. Skull - Normal.   Eyes  Pupils equal ,round ,react to light and accomidate   Ears  External Ear Right: Normal, Left: Normal. Canal - Right: Normal, Left: Normal. TM - Right: Normal left: Normal   Nose  External Nose, Normal, Septum -deviated to the left,Nasal Vault, clear. Turbinates - Right: Normal left: Normal   Mouth/Throat  Lips/teeth/gums - Normal. Tonsils -1+ oropharynx - Normal.   Neck Exam  Inspection - Normal. Palpation - Normal. Parotid gland - Normal. Thyroid gland -normal   Lymph Detail  Submental. Submandibular. Anterior cervical. Posterior cervical. Supraclavicular.     Flexible Laryngoscopy Procedure Note (47344)    Due to inability for adequate examination of the larynx and need for magnification to perform the examination, endoscopy was performed.  Risks and benefits were discussed with patient/family and they have given verbal consent to proceed.    Pre-operative Diagnosis:   1. Hoarseness        Post-operative Diagnosis: Same    Procedure: Diagnostic flexible fiberoptic laryngoscopy    Anesthesia: Topical anesthetic Brewer     Surgeon Randy Paez MD    EBL: 0cc    Procedure Detail & Findings: The  patient was topically anesthetized within the nose, bilaterally.  The flexible laryngoscope was placed through the nostrils bilaterally.  The findings within the nose deviated to the left.  The findings within the nasopharynx clear.  The findings within the hypopharynx no masses.  The findings within the larynx normal vocal cord mobility no masses lesions      .    Condition: Stable    Complications: Patient tolerated the procedure well with no immediate complication.    Randy Francis MD      ASSESSMENT AND PLAN:   1. Hoarseness  Doing well follow-up as needed      The patient indicates understanding of these issues and agrees to the plan.      Randy Paez MD  6/4/2024  12:35 PM

## 2024-10-10 ENCOUNTER — OFFICE VISIT (OUTPATIENT)
Dept: FAMILY MEDICINE CLINIC | Facility: CLINIC | Age: 74
End: 2024-10-10
Payer: COMMERCIAL

## 2024-10-10 VITALS
DIASTOLIC BLOOD PRESSURE: 62 MMHG | BODY MASS INDEX: 26.66 KG/M2 | HEART RATE: 92 BPM | WEIGHT: 160 LBS | OXYGEN SATURATION: 97 % | HEIGHT: 65 IN | RESPIRATION RATE: 15 BRPM | SYSTOLIC BLOOD PRESSURE: 118 MMHG

## 2024-10-10 DIAGNOSIS — M79.672 BILATERAL FOOT PAIN: Primary | ICD-10-CM

## 2024-10-10 DIAGNOSIS — M79.671 BILATERAL FOOT PAIN: Primary | ICD-10-CM

## 2024-10-10 PROCEDURE — 3078F DIAST BP <80 MM HG: CPT | Performed by: PHYSICIAN ASSISTANT

## 2024-10-10 PROCEDURE — 3074F SYST BP LT 130 MM HG: CPT | Performed by: PHYSICIAN ASSISTANT

## 2024-10-10 PROCEDURE — 99213 OFFICE O/P EST LOW 20 MIN: CPT | Performed by: PHYSICIAN ASSISTANT

## 2024-10-10 PROCEDURE — 3008F BODY MASS INDEX DOCD: CPT | Performed by: PHYSICIAN ASSISTANT

## 2024-10-10 RX ORDER — METHYLPREDNISOLONE 4 MG
TABLET, DOSE PACK ORAL
Qty: 21 TABLET | Refills: 0 | Status: SHIPPED | OUTPATIENT
Start: 2024-10-10

## 2024-10-10 NOTE — PROGRESS NOTES
Subjective:   Patient ID: Grace Guy is a 74 year old female.    HPI  Patient presents c/o bilateral foot pain, left worse  Never had this before  H/o arthritis  Last week she had covid/flu shot both  A day or so later felt like \"a truck hit her\"  Was extremely fatigued  She was unable to go to work  She rested for a few days  Then was finally able to go to work but had no energy and was dizzy so she went back home  She has been off the last few days and resting    She has had pain with walking  Ice and motrin are not helping much  Hurts mostly bottom of feet  She wears [employer] shoes to work, also has skechers shoes she wears during the day  She does not walk bare foot      History/Other:   Review of Systems   Constitutional:  Negative for chills, diaphoresis and fever.   Musculoskeletal:  Positive for arthralgias (foot pain, left worse than right) and gait problem. Negative for joint swelling.   Neurological:  Positive for numbness (tingling). Negative for weakness.     Current Outpatient Medications   Medication Sig Dispense Refill    MAGNESIUM OR Take by mouth.      atorvastatin 20 MG Oral Tab Take 1 tablet (20 mg total) by mouth daily. 90 tablet 3    Multiple Vitamins-Minerals (MULTIVITAL OR) Take 1 tablet by mouth daily.        aspirin 81 MG Oral Tab Take 1 tablet (81 mg total) by mouth daily.       Allergies:Allergies[1]    Objective:   Physical Exam  Vitals and nursing note reviewed.   Constitutional:       Appearance: Normal appearance.   Musculoskeletal:      Left foot: Normal range of motion and normal capillary refill. Tenderness and bony tenderness present. No swelling or crepitus. Normal pulse.      Comments: TTP midfoot, mostly lateral side and along the 5th metatarsal   Neurological:      Mental Status: She is alert.         Assessment & Plan:   1. Bilateral foot pain  Etiology unclear. May be secondary to the known arthritis of the foot and inflammation. Will empirically tx with steroid pack as  directed. If not improving then refer to podiatry.           [1]   Allergies  Allergen Reactions    Opioid Analgesics UNKNOWN    Sulfa Antibiotics RASH, ITCHING and UNKNOWN    Morphine RASH    Nitrofurantoin OTHER (SEE COMMENTS)     May have cause interstitial fibrosis per marcela Ontiveros.  Avoid use in future.

## 2025-01-31 ENCOUNTER — OFFICE VISIT (OUTPATIENT)
Dept: FAMILY MEDICINE CLINIC | Facility: CLINIC | Age: 75
End: 2025-01-31
Payer: COMMERCIAL

## 2025-01-31 ENCOUNTER — LAB ENCOUNTER (OUTPATIENT)
Dept: LAB | Age: 75
End: 2025-01-31
Attending: FAMILY MEDICINE
Payer: COMMERCIAL

## 2025-01-31 VITALS
RESPIRATION RATE: 16 BRPM | WEIGHT: 155 LBS | HEART RATE: 72 BPM | SYSTOLIC BLOOD PRESSURE: 108 MMHG | HEIGHT: 65 IN | OXYGEN SATURATION: 98 % | DIASTOLIC BLOOD PRESSURE: 78 MMHG | BODY MASS INDEX: 25.83 KG/M2

## 2025-01-31 DIAGNOSIS — Z00.00 WELLNESS EXAMINATION: Primary | ICD-10-CM

## 2025-01-31 DIAGNOSIS — Z13.0 SCREENING FOR DEFICIENCY ANEMIA: ICD-10-CM

## 2025-01-31 DIAGNOSIS — Z13.220 LIPID SCREENING: ICD-10-CM

## 2025-01-31 DIAGNOSIS — L98.9 SKIN LESION: ICD-10-CM

## 2025-01-31 DIAGNOSIS — Z00.00 WELLNESS EXAMINATION: ICD-10-CM

## 2025-01-31 DIAGNOSIS — R25.2 CRAMP AND SPASM: ICD-10-CM

## 2025-01-31 DIAGNOSIS — T75.3XXS MOTION SICKNESS, SEQUELA: ICD-10-CM

## 2025-01-31 LAB
ALBUMIN SERPL-MCNC: 4.1 G/DL (ref 3.2–4.8)
ALBUMIN/GLOB SERPL: 1.2 {RATIO} (ref 1–2)
ALP LIVER SERPL-CCNC: 100 U/L
ALT SERPL-CCNC: 16 U/L
ANION GAP SERPL CALC-SCNC: 7 MMOL/L (ref 0–18)
AST SERPL-CCNC: 26 U/L (ref ?–34)
BASOPHILS # BLD AUTO: 0.1 X10(3) UL (ref 0–0.2)
BASOPHILS NFR BLD AUTO: 1.5 %
BILIRUB SERPL-MCNC: 0.6 MG/DL (ref 0.2–1.1)
BUN BLD-MCNC: 21 MG/DL (ref 9–23)
CALCIUM BLD-MCNC: 9.2 MG/DL (ref 8.7–10.6)
CHLORIDE SERPL-SCNC: 110 MMOL/L (ref 98–112)
CHOLEST SERPL-MCNC: 120 MG/DL (ref ?–200)
CO2 SERPL-SCNC: 27 MMOL/L (ref 21–32)
CREAT BLD-MCNC: 1.18 MG/DL
EGFRCR SERPLBLD CKD-EPI 2021: 48 ML/MIN/1.73M2 (ref 60–?)
EOSINOPHIL # BLD AUTO: 0.28 X10(3) UL (ref 0–0.7)
EOSINOPHIL NFR BLD AUTO: 4.2 %
ERYTHROCYTE [DISTWIDTH] IN BLOOD BY AUTOMATED COUNT: 13.1 %
FASTING PATIENT LIPID ANSWER: YES
FASTING STATUS PATIENT QL REPORTED: YES
GLOBULIN PLAS-MCNC: 3.3 G/DL (ref 2–3.5)
GLUCOSE BLD-MCNC: 96 MG/DL (ref 70–99)
HCT VFR BLD AUTO: 39.9 %
HDLC SERPL-MCNC: 52 MG/DL (ref 40–59)
HGB BLD-MCNC: 12.4 G/DL
IMM GRANULOCYTES # BLD AUTO: 0.03 X10(3) UL (ref 0–1)
IMM GRANULOCYTES NFR BLD: 0.5 %
LDLC SERPL CALC-MCNC: 50 MG/DL (ref ?–100)
LYMPHOCYTES # BLD AUTO: 1.98 X10(3) UL (ref 1–4)
LYMPHOCYTES NFR BLD AUTO: 30 %
MAGNESIUM SERPL-MCNC: 2.2 MG/DL (ref 1.6–2.6)
MCH RBC QN AUTO: 29.8 PG (ref 26–34)
MCHC RBC AUTO-ENTMCNC: 31.1 G/DL (ref 31–37)
MCV RBC AUTO: 95.9 FL
MONOCYTES # BLD AUTO: 0.52 X10(3) UL (ref 0.1–1)
MONOCYTES NFR BLD AUTO: 7.9 %
NEUTROPHILS # BLD AUTO: 3.69 X10 (3) UL (ref 1.5–7.7)
NEUTROPHILS # BLD AUTO: 3.69 X10(3) UL (ref 1.5–7.7)
NEUTROPHILS NFR BLD AUTO: 55.9 %
NONHDLC SERPL-MCNC: 68 MG/DL (ref ?–130)
OSMOLALITY SERPL CALC.SUM OF ELEC: 301 MOSM/KG (ref 275–295)
PLATELET # BLD AUTO: 234 10(3)UL (ref 150–450)
POTASSIUM SERPL-SCNC: 4.9 MMOL/L (ref 3.5–5.1)
PROT SERPL-MCNC: 7.4 G/DL (ref 5.7–8.2)
RBC # BLD AUTO: 4.16 X10(6)UL
SODIUM SERPL-SCNC: 144 MMOL/L (ref 136–145)
TRIGL SERPL-MCNC: 96 MG/DL (ref 30–149)
VLDLC SERPL CALC-MCNC: 14 MG/DL (ref 0–30)
WBC # BLD AUTO: 6.6 X10(3) UL (ref 4–11)

## 2025-01-31 PROCEDURE — 80061 LIPID PANEL: CPT | Performed by: FAMILY MEDICINE

## 2025-01-31 PROCEDURE — 80053 COMPREHEN METABOLIC PANEL: CPT | Performed by: FAMILY MEDICINE

## 2025-01-31 PROCEDURE — 85025 COMPLETE CBC W/AUTO DIFF WBC: CPT | Performed by: FAMILY MEDICINE

## 2025-01-31 PROCEDURE — 83735 ASSAY OF MAGNESIUM: CPT | Performed by: FAMILY MEDICINE

## 2025-01-31 RX ORDER — SCOPOLAMINE 1 MG/3D
1 PATCH, EXTENDED RELEASE TRANSDERMAL
Qty: 4 PATCH | Refills: 0 | Status: SHIPPED | OUTPATIENT
Start: 2025-01-31

## 2025-01-31 NOTE — PROGRESS NOTES
HPI:     Grace Guy is a 74 year old female who presents for an Annual Health Visit.      She is very cautious about going up and down stairs, doesn't feel like she is at risk for falling.    Nails and hair issues, nails not growing, and can split. Hair feeling thinner    Is noting she is passing more gas than she used to.    Social:  Still works at [employer], 39 years, as a .   , her and  at home 4 kids, 12 grandkids.  All off on their own.  Going to [place] in March, son taking them.        Allergies:     Allergies   Allergen Reactions    Opioid Analgesics UNKNOWN    Sulfa Antibiotics RASH, ITCHING and UNKNOWN    Morphine RASH    Nitrofurantoin OTHER (SEE COMMENTS)     May have cause interstitial fibrosis per marcela Ontiveros.  Avoid use in future.       CURRENT MEDICATIONS:   Current Outpatient Medications   Medication Sig Dispense Refill    Scopolamine 1.5mg TD patch 1mg/3days Place 1 patch onto the skin every third day. 4 patch 0    MAGNESIUM OR Take by mouth.      atorvastatin 20 MG Oral Tab Take 1 tablet (20 mg total) by mouth daily. 90 tablet 3    Multiple Vitamins-Minerals (MULTIVITAL OR) Take 1 tablet by mouth daily.        aspirin 81 MG Oral Tab Take 1 tablet (81 mg total) by mouth daily.        MEDICAL INFORMATION:   Past Medical History:    Allergic rhinitis    Arthritis    Change in hair    Cholelithiasis    Constipation    Current moderate episode of major depressive disorder without prior episode (HCC)    Frequent urination    Heart attack (HCC)    no intervention    Heartburn    High cholesterol    History of adenomatous polyp of colon    History of depression    Hyperlipidemia    Recurrent UTI (urinary tract infection)    Sleep disturbance    Uncomfortable fullness after meals    Wears glasses      Past Surgical History:   Procedure Laterality Date    Angiogram      no stents    Appendectomy  2005    Cataract  2016    Colonoscopy  1/17/2011    Dr. Nielsen, 1 polyp     Cystoscopy,insert ureteral stent  11/15/2016    stent removal 11/18/16. Re-inserted now remains right ureter    Eye surgery  2014    Knee arthroscopy  1976    1982    Laparoscopic cholecystectomy N/A 12/9/2016    Procedure: LAPAROSCOPIC CHOLECYSTECTOMY;  Surgeon: Riccardo Paez MD;  Location: [facility]    Laparoscopy, surgical; colectomy, partial, w/ anastomos      malignant polyp    Emily biopsy stereo nodule 1 site right (cpt=19081) Right 2001    benign.    Tubal ligation  1983      Family History   Problem Relation Age of Onset    Cancer Mother         liver    Vitiligo Mother     Other (cirrhosis) Mother     Other (gallbladder attack) Mother     Heart Disease Father     Heart Surgery Father     Vitiligo Sister     Other (cholecystectomy) Sister     Hypertension Sister     Other (osteoarthritis knee) Sister     Heart Attack Paternal Grandfather     Other (multiple sclerosis) Daughter         diagnosed 2018    Alcohol and Other Disorders Associated Daughter     Depression Daughter       SOCIAL HISTORY:   Social History     Socioeconomic History    Marital status:    Tobacco Use    Smoking status: Never    Smokeless tobacco: Never   Vaping Use    Vaping status: Never Used   Substance and Sexual Activity    Alcohol use: Yes     Comment: social    Drug use: No   Social History Narrative        4 children    Never smoked     at [employer]    Occasional EtOH    No drugs     Social Drivers of Health      Received [facility], [facility]    Housing Stability     Social History     Social History Narrative        4 children    Never smoked     at [employer]    Occasional EtOH    No drugs        REVIEW OF SYSTEMS:     Constitutional: negative  Eyes: negative  ENT: negative  Respiratory: negative  Cardiovascular: negative  Gastrointestinal:  some heartburn, sometimes only eats one large meal.  Integument/Breast:  has a mole on neck  Genitourinary:  negative  Heme/Lymph: negative  Musculoskeletal:  can get a sharp pain in her arm and finger will distort, forearm and into fingers. Happens occasionally, has had her foot turn and wake her up.  Neurological: negative  Psych:  can have some down mood, but nothing that limits her or lasts. Switched to nights to help with anxiety.  Endocrine: negative  Allergic/Immune: negative        EXAM:   /78   Pulse 72   Resp 16   Ht 5' 5\" (1.651 m)   Wt 155 lb (70.3 kg)   SpO2 98%   BMI 25.79 kg/m²    Wt Readings from Last 6 Encounters:   01/31/25 155 lb (70.3 kg)   10/10/24 160 lb (72.6 kg)   05/15/24 153 lb (69.4 kg)   01/29/24 153 lb 12.8 oz (69.8 kg)   11/16/23 158 lb 9.6 oz (71.9 kg)   09/27/23 155 lb (70.3 kg)     Body mass index is 25.79 kg/m².    General: alert, appears stated age, and cooperative  Head: Normocephalic, without obvious abnormality, atraumatic  Eyes: conjunctivae/corneas clear. PERRL, EOM's intact. Fundi benign.  Ears: normal TM's and external ear canals both ears  Nose: Nares normal. Septum midline. Mucosa normal. No drainage or sinus tenderness.  Throat: lips, mucosa, and tongue normal; teeth and gums normal  Neck: no adenopathy, no carotid bruit, no JVD, supple, symmetrical, trachea midline, and thyroid not enlarged, symmetric, no tenderness/mass/nodules  Heart: S1, S2 normal, no murmur, click, rub or gallop, regular rate and rhythm  Lungs: clear to auscultation bilaterally  Breast:  deferred  Abdomen: soft, non-tender; bowel sounds normal; no masses,  no organomegaly  Pelvic: deferred  Back: symmetric, no curvature. ROM normal. No CVA tenderness.  Extremities: extremities normal, atraumatic, no cyanosis or edema  Pulses: 2+ and symmetric  Skin: Skin color, texture, turgor normal. No rashes or lesions  Lymph Nodes: Cervical, supraclavicular, and axillary nodes normal.  Neurologic: Grossly normal      ASSESSMENT AND PLAN:   Diagnoses and all orders for this visit:    Wellness examination  -      Comp Metabolic Panel (14); Future  -     CBC With Differential With Platelet; Future  -     Lipid Panel; Future    Lipid screening  -     Lipid Panel; Future    Screening for deficiency anemia  -     CBC With Differential With Platelet; Future    Motion sickness, sequela  -     Scopolamine 1.5mg TD patch 1mg/3days; Place 1 patch onto the skin every third day.    Overall doing well, will get screening labs.  There are no Patient Instructions on file for this visit.    The patient indicates understanding of these issues and agrees to the plan.    Problem List:  Patient Active Problem List   Diagnosis    Primary localized osteoarthrosis, hand    Osteoarthrosis, unspecified whether generalized or localized, ankle and foot    Coronary artery disease    Hyperlipidemia    Hydronephrosis, unspecified hydronephrosis type    Bunionette of right foot    Cystocele, midline    Elevated C-reactive protein (CRP)    Skin tags, multiple acquired    Altered bowel habits    Ground glass opacity present on imaging of lung    Positive VILMA (antinuclear antibody)    Anti-RNP antibodies present    Primary osteoarthritis of right hip    Arthritis of first metatarsophalangeal (MTP) joint of left foot    Arthritis of first metatarsophalangeal (MTP) joint of right foot       Vj Ceron MD  1/31/2025  1:04 PM

## 2025-03-03 ENCOUNTER — HOSPITAL ENCOUNTER (OUTPATIENT)
Dept: CT IMAGING | Age: 75
Discharge: HOME | End: 2025-03-03
Attending: INTERNAL MEDICINE
Payer: COMMERCIAL

## 2025-03-03 DIAGNOSIS — J84.9 ILD (INTERSTITIAL LUNG DISEASE) (HCC): ICD-10-CM

## 2025-03-03 DIAGNOSIS — J47.9 BRONCHIECTASIS WITHOUT COMPLICATION (HCC): ICD-10-CM

## 2025-03-03 PROCEDURE — 71250 CT THORAX DX C-: CPT | Performed by: INTERNAL MEDICINE

## 2025-04-21 ENCOUNTER — APPOINTMENT (OUTPATIENT)
Dept: GENERAL RADIOLOGY | Age: 75
End: 2025-04-21
Attending: NURSE PRACTITIONER
Payer: COMMERCIAL

## 2025-04-21 ENCOUNTER — HOSPITAL ENCOUNTER (OUTPATIENT)
Age: 75
Discharge: HOME | End: 2025-04-21
Payer: COMMERCIAL

## 2025-04-21 VITALS
RESPIRATION RATE: 18 BRPM | SYSTOLIC BLOOD PRESSURE: 109 MMHG | TEMPERATURE: 99 F | DIASTOLIC BLOOD PRESSURE: 64 MMHG | OXYGEN SATURATION: 94 % | HEART RATE: 93 BPM

## 2025-04-21 DIAGNOSIS — R05.9 COUGH: ICD-10-CM

## 2025-04-21 DIAGNOSIS — J40 BRONCHITIS: Primary | ICD-10-CM

## 2025-04-21 LAB — SARS-COV-2 RNA RESP QL NAA+PROBE: NOT DETECTED

## 2025-04-21 PROCEDURE — U0002 COVID-19 LAB TEST NON-CDC: HCPCS | Performed by: NURSE PRACTITIONER

## 2025-04-21 PROCEDURE — 99204 OFFICE O/P NEW MOD 45 MIN: CPT | Performed by: NURSE PRACTITIONER

## 2025-04-21 PROCEDURE — 71046 X-RAY EXAM CHEST 2 VIEWS: CPT | Performed by: NURSE PRACTITIONER

## 2025-04-21 RX ORDER — CODEINE PHOSPHATE AND GUAIFENESIN 10; 100 MG/5ML; MG/5ML
5 SOLUTION ORAL NIGHTLY PRN
Qty: 25 ML | Refills: 0 | Status: SHIPPED | OUTPATIENT
Start: 2025-04-21 | End: 2025-04-24

## 2025-04-21 RX ORDER — PREDNISONE 10 MG/1
30 TABLET ORAL DAILY
Qty: 15 TABLET | Refills: 0 | Status: SHIPPED | OUTPATIENT
Start: 2025-04-21 | End: 2025-04-26

## 2025-04-21 RX ORDER — ALBUTEROL SULFATE 90 UG/1
2 INHALANT RESPIRATORY (INHALATION) EVERY 4 HOURS PRN
Qty: 1 EACH | Refills: 0 | Status: SHIPPED | OUTPATIENT
Start: 2025-04-21 | End: 2025-05-21

## 2025-04-21 NOTE — DISCHARGE INSTRUCTIONS
- Prednisone: 3 tabs with breakfast x 5 days.   - guaifenesin-codeine cough syrup at night (will cause drowsiness/constipation/dizziness)  - ok to take plain mucinex - 1200mg twice a day with a big glass of water  Albuterol: 2 puffs every 4-6 hours if needed for wheezing.   - Nasal saline - either spray (\"Ocean\" brand) or Adolfo Med Sinus Rinse 3 times a day  Flonase: 2 sprays to each nostril in the AM.  - Rest and push fluids  - Hot lemon tea with honey  - Steam twice a day (either in shower or with cup of hot water and a towel over your head)     Please follow up with your primary care doctor in 1 week if not improving.    If any persistent, worsening or new/ concerning symptoms develop please go to the Emergency room.

## 2025-04-21 NOTE — ED PROVIDER NOTES
Patient Seen in: [facility]      History     Chief Complaint   Patient presents with    Cough/URI     Stated Complaint: Cough; Runny Nose    Subjective:   74 yo female with PMH of interstitial lung disease and bronchiectasis presents with complaint of cough, runny nose and fatigue that began 4 days ago.   Pt states she no longer uses inhalers.   No household members with similar symptoms. Pt works at [employer].   OTC's include a cough syrup which is not helping.     Pt denies fever, chills, sweats, malaise, shortness of breath, orthopnea, chest pain, dizziness, nausea, vomiting or peripheral edema.         The history is provided by the patient.       History of Present Illness               Objective:     Past Medical History:    Allergic rhinitis    Arthritis    Change in hair    Cholelithiasis    Constipation    Current moderate episode of major depressive disorder without prior episode (HCC)    Frequent urination    Heart attack (HCC)    no intervention    Heartburn    High cholesterol    History of adenomatous polyp of colon    History of depression    Hyperlipidemia    Recurrent UTI (urinary tract infection)    Sleep disturbance    Uncomfortable fullness after meals    Wears glasses              Past Surgical History:   Procedure Laterality Date    Angiogram      no stents    Appendectomy  2005    Cataract  2016    Colonoscopy  1/17/2011    Dr. Nielsen, 1 polyp    Cystoscopy,insert ureteral stent  11/15/2016    stent removal 11/18/16. Re-inserted now remains right ureter    Eye surgery  2014    Knee arthroscopy  1976    1982    Laparoscopic cholecystectomy N/A 12/9/2016    Procedure: LAPAROSCOPIC CHOLECYSTECTOMY;  Surgeon: Riccardo Paez MD;  Location:  MAIN OR    Laparoscopy, surgical; colectomy, partial, w/ anastomos      malignant polyp    Emily biopsy stereo nodule 1 site right (cpt=19081) Right 2001    benign.    Tubal ligation  1983                Social History     Socioeconomic History     Marital status:    Tobacco Use    Smoking status: Never    Smokeless tobacco: Never   Vaping Use    Vaping status: Never Used   Substance and Sexual Activity    Alcohol use: Yes     Comment: social    Drug use: No   Social History Narrative        4 children    Never smoked     at [employer]    Occasional EtOH    No drugs     Social Drivers of Health      Received from [facility]    Housing Stability              Review of Systems   Constitutional:  Positive for fatigue. Negative for chills, diaphoresis and fever.   HENT:  Positive for rhinorrhea. Negative for trouble swallowing.    Respiratory:  Positive for cough. Negative for shortness of breath.    Cardiovascular:  Negative for chest pain, palpitations and leg swelling.   Gastrointestinal:  Negative for abdominal pain, nausea and vomiting.   Musculoskeletal:  Negative for back pain.   Skin:  Negative for rash.       Positive for stated complaint: Cough; Runny Nose  Other systems are as noted in HPI.  Constitutional and vital signs reviewed.      All other systems reviewed and negative except as noted above.                  Physical Exam     ED Triage Vitals [04/21/25 1524]   /61   Pulse 108   Resp 22   Temp 98.8 °F (37.1 °C)   Temp src Oral   SpO2 94 %   O2 Device None (Room air)       Current Vitals:   Vital Signs  BP: 109/64  Pulse: 93  Resp: 18  Temp: 98.8 °F (37.1 °C)  Temp src: Oral    Oxygen Therapy  SpO2: 94 %  O2 Device: None (Room air)        Physical Exam  Vitals and nursing note reviewed.   Constitutional:       General: She is not in acute distress.     Appearance: Normal appearance. She is not ill-appearing, toxic-appearing or diaphoretic.   HENT:      Head: Normocephalic.      Right Ear: Tympanic membrane normal.      Left Ear: Tympanic membrane normal.      Nose: Rhinorrhea present. No congestion.      Mouth/Throat:      Mouth: Mucous membranes are moist.      Pharynx: Oropharynx is clear.   Eyes:       General: No scleral icterus.     Conjunctiva/sclera: Conjunctivae normal.   Cardiovascular:      Rate and Rhythm: Normal rate and regular rhythm.      Heart sounds: No murmur heard.  Pulmonary:      Effort: Pulmonary effort is normal. No respiratory distress.      Breath sounds: No stridor. Wheezing (faint expiratory wheeze VIDHI) present. No rhonchi or rales.   Chest:      Chest wall: No tenderness.   Musculoskeletal:      Cervical back: Normal range of motion and neck supple.   Lymphadenopathy:      Cervical: No cervical adenopathy.   Skin:     General: Skin is warm and dry.      Findings: No rash.   Neurological:      Mental Status: She is alert.   Psychiatric:         Mood and Affect: Mood normal.           Physical Exam              ED Course     Labs Reviewed   RAPID SARS-COV-2 BY PCR - Normal      Results         Rapid SARS-CoV-2 by PCR        Specimen Information: Nares; Other      Component Value Flag Ref Range Units Status    Rapid SARS-CoV-2 by PCR Not Detected      Not Detected  Final    Comment:    This test is intended for the qualitative detection of nucleic acid from the SARS-CoV-2 viral RNA from individuals who are suspected of COVID-19 infection by their healthcare provider.    A \"Detected\" result is considered a positive test result for COVID-19.   A \"Not Detected\" result for this test means that SARS-CoV-2 RNA was not present in the sample above the limit of detection of the assay.    Test performed using the Abbott ID NOW COVID-19 assay performed on the ID NOW Instrument, [employer] [place], [place] [address].    This test is being used under the Food and Drug Administration's Emergency Use Authorization.    The authorized Fact Sheet for Healthcare Providers for this assay is available upon request from the laboratory.    [facility]   [address] [place] [address]   Phone: [phone]  Fax: [phone]  [facility] # [id number]                    XR CHEST PA + LAT CHEST (CPT=71046)          PROCEDURE:  XR CHEST PA + LAT CHEST (CPT=71046)     INDICATIONS:  worsening cough and malaise. known RUL nodule     COMPARISON:  [facility], CT, CT CHEST (CPT=71250), 3/03/2025, 8:58 AM.  [facility], XR, XR CHEST PA + LAT CHEST (BLK=38253), 5/06/2022, 9:56 AM.  [facility], XR, XR CHEST PA + LAT CHEST (OGU=[id number]),   4/19/2022, 12:54 PM.     TECHNIQUE:  PA and lateral chest radiographs were obtained.     PATIENT STATED HISTORY: (As transcribed by Technologist)  The patient has a cough for four days.         FINDINGS:    There are bilateral interstitial opacities, which are grossly similar to prior and may reflect patient's known chronic interstitial lung disease.  Superimposed infectious/inflammatory process is not entirely excluded, correlate clinically.  No pleural   effusion or pneumothorax.  Cardiomediastinal silhouette is stable.  Degenerative changes of the spine.                   =====  CONCLUSION:  See above        LOCATION:  [facility]        Dictated by (CST): Buck Wright MD on 4/21/2025 at 5:01 PM       Finalized by (CST): Buck Wright MD on 4/21/2025 at 5:02 PM              [facility]      Medical Decision Making  76 yo female presents with complaint of cough, runny nose and fatigue that began 4 days ago.   Diff dx include Covid/viral illness, bronchitis, less likely pneumonia.   On exam, pt is well appearing, afebrile, Sp02 94%, faint expiratory wheeze on left.   Covid result negative.   CXR without new consolidation/or pneumonia. Bilateral interstitial opacities similar to prior CXR and may reflect patient's known chronic interstitial lung disease.    Discussed findings with pt.   Current symptoms present for only 4 days, no fevers, chills, sweats or malaise. Likely viral illness that may exacerbate lung disease. Will send Prednisone, Albuterol, and small amount of Guaifenesin-codeine cough  syrup HS PRN (pt states Tessalon has never worked for her). Discussed side effects of codeine including constipation, dizziness, drowsiness; only to be used before bed.   Work note provided.   Follow up with PCP/Pulmonary if not improving in the next week.   ED precautions discussed for persistent, worsening or new concerning symptoms.   Pt agreeable to plan.    Amount and/or Complexity of Data Reviewed  External Data Reviewed: labs, radiology and notes.  Labs: ordered.  Radiology: ordered.    Risk  OTC drugs.  Prescription drug management.        Disposition and Plan     Clinical Impression:  1. Bronchitis    2. Cough         Disposition:  Discharge  4/21/2025  5:25 pm    Follow-up:  Vj Ceron MD  [address] [place] [address]  [phone]    In 1 week  If symptoms worsen          Medications Prescribed:  Discharge Medication List as of 4/21/2025  5:28 PM        START taking these medications    Details   predniSONE 10 MG Oral Tab Take 3 tablets (30 mg total) by mouth daily for 5 days., Normal, Disp-15 tablet, R-0      albuterol 108 (90 Base) MCG/ACT Inhalation Aero Soln Inhale 2 puffs into the lungs every 4 (four) hours as needed for Wheezing., Normal, Disp-1 each, R-0      guaiFENesin-codeine 100-10 MG/5ML Oral Solution Take 5 mL by mouth nightly as needed for cough., Normal, Disp-25 mL, R-0Tolerates             Supplementary Documentation:

## 2025-04-24 ENCOUNTER — OFFICE VISIT (OUTPATIENT)
Dept: FAMILY MEDICINE CLINIC | Facility: CLINIC | Age: 75
End: 2025-04-24
Payer: COMMERCIAL

## 2025-04-24 VITALS
OXYGEN SATURATION: 98 % | HEART RATE: 117 BPM | DIASTOLIC BLOOD PRESSURE: 60 MMHG | SYSTOLIC BLOOD PRESSURE: 110 MMHG | WEIGHT: 155 LBS | BODY MASS INDEX: 25.83 KG/M2 | RESPIRATION RATE: 18 BRPM | HEIGHT: 65 IN

## 2025-04-24 DIAGNOSIS — J40 BRONCHITIS: ICD-10-CM

## 2025-04-24 PROCEDURE — 99213 OFFICE O/P EST LOW 20 MIN: CPT | Performed by: FAMILY MEDICINE

## 2025-04-24 PROCEDURE — 3078F DIAST BP <80 MM HG: CPT | Performed by: FAMILY MEDICINE

## 2025-04-24 PROCEDURE — 3008F BODY MASS INDEX DOCD: CPT | Performed by: FAMILY MEDICINE

## 2025-04-24 PROCEDURE — 3074F SYST BP LT 130 MM HG: CPT | Performed by: FAMILY MEDICINE

## 2025-04-24 RX ORDER — AZITHROMYCIN 250 MG/1
TABLET, FILM COATED ORAL
Qty: 6 TABLET | Refills: 0 | Status: SHIPPED | OUTPATIENT
Start: 2025-04-24 | End: 2025-04-29

## 2025-04-24 RX ORDER — CODEINE PHOSPHATE AND GUAIFENESIN 10; 100 MG/5ML; MG/5ML
5 SOLUTION ORAL 3 TIMES DAILY PRN
Qty: 50 ML | Refills: 0 | Status: SHIPPED | OUTPATIENT
Start: 2025-04-24

## 2025-04-24 NOTE — PROGRESS NOTES
Subjective:   Patient ID: Grace Guy is a 75 year old female.    She was having complaint of feeling sick, cough and chills but no fever, went to urgent care, a week ago.  +cough  +Bodyache due to coughing   +fatigue, + tiredness  +SOB on exertion , no CP   Deep breathing causing cough  Feels better than before.  She is working 3 times/week. She wants a leave application for next 3 days, as work makes her more tired.        History/Other:   Review of Systems   All other systems reviewed and are negative.    Current Medications[1]  Allergies:Allergies[2]    Objective:   Physical Exam  Constitutional:       Appearance: She is well-developed.   Eyes:      Conjunctiva/sclera: Conjunctivae normal.   Cardiovascular:      Rate and Rhythm: Normal rate and regular rhythm.      Heart sounds: Normal heart sounds.   Pulmonary:      Effort: Pulmonary effort is normal.      Breath sounds: Normal breath sounds.   Skin:     General: Skin is warm and dry.   Neurological:      Mental Status: She is alert.      Deep Tendon Reflexes: Reflexes are normal and symmetric.       Assessment & Plan:   1. Bronchitis      1. Bronchitis  - guaiFENesin-codeine 100-10 MG/5ML Oral Solution; Take 5 mL by mouth 3 (three) times daily as needed for cough.  Dispense: 50 mL; Refill: 0    Meds This Visit:  Requested Prescriptions     Signed Prescriptions Disp Refills    guaiFENesin-codeine 100-10 MG/5ML Oral Solution 50 mL 0     Sig: Take 5 mL by mouth 3 (three) times daily as needed for cough.    azithromycin 250 MG Oral Tab 6 tablet 0     Sig: Take 2 tablets (500 mg total) by mouth daily for 1 day, THEN 1 tablet (250 mg total) daily for 4 days.     Imaging & Referrals:  None         [1]   Current Outpatient Medications   Medication Sig Dispense Refill    guaiFENesin-codeine 100-10 MG/5ML Oral Solution Take 5 mL by mouth 3 (three) times daily as needed for cough. 50 mL 0    azithromycin 250 MG Oral Tab Take 2 tablets (500 mg total) by mouth daily for  1 day, THEN 1 tablet (250 mg total) daily for 4 days. 6 tablet 0    predniSONE 10 MG Oral Tab Take 3 tablets (30 mg total) by mouth daily for 5 days. 15 tablet 0    albuterol 108 (90 Base) MCG/ACT Inhalation Aero Soln Inhale 2 puffs into the lungs every 4 (four) hours as needed for Wheezing. 1 each 0    MAGNESIUM OR Take by mouth.      atorvastatin 20 MG Oral Tab Take 1 tablet (20 mg total) by mouth daily. 90 tablet 3    Multiple Vitamins-Minerals (MULTIVITAL OR) Take 1 tablet by mouth in the morning.      aspirin 81 MG Oral Tab Take 1 tablet (81 mg total) by mouth in the morning.     [2]   Allergies  Allergen Reactions    Opioid Analgesics UNKNOWN    Sulfa Antibiotics RASH, ITCHING and UNKNOWN    Morphine RASH    Nitrofurantoin OTHER (SEE COMMENTS)     May have cause interstitial fibrosis per marcela Ontiveros.  Avoid use in future.

## (undated) DIAGNOSIS — R06.02 SHORTNESS OF BREATH: ICD-10-CM

## (undated) DIAGNOSIS — U07.1 COVID: ICD-10-CM

## (undated) DIAGNOSIS — R79.81 LOW OXYGEN SATURATION: Primary | ICD-10-CM

## (undated) NOTE — ED AVS SNAPSHOT
Bolivar Sol   MRN: [id number]    Department:  [facility] in [place]   Date of Visit:  2/15/2019           Disclosure     Insurance plans vary and the physician(s) referred by the ER may not be covered by your plan.  Please contact yo tell this physician (or your personal doctor if your instructions are to return to your personal doctor) about any new or lasting problems. The primary care or specialist physician will see patients referred from the [facility] Emergency Department.  Gene Pena

## (undated) NOTE — LETTER
[facility], [address], [address]  [phone]         2/25/2019    Darrin Medina  Date of birth February 9, 1950    To whomever it may concern:    I would like to authorize this patient to hav

## (undated) NOTE — LETTER
Date: 6/15/2021    Patient Name: Ha Mendez          To Whom it may concern: This letter has been written at the patient's request. The above patient was seen at the [facility] for treatment of a medical condition.     This patient should

## (undated) NOTE — LETTER
Date: 4/24/2025    Patient Name: Grace Guy          To Whom it may concern:    The above patient was seen at [facility] for treatment of a medical condition.    This patient should be excused from attending work from 4/20/25 through 4/27/25.    The patient may return to work on 4/28/25 without restrictions.    Sincerely,    Dr. Shweta Jane, DO

## (undated) NOTE — LETTER
07/09/21        Bolivar Sol  [address]  [facility] Room [address]      Dear Katie Arias records indicate that you have outstanding lab work and or testing that was ordered for you and has not yet been completed:  Orders Placed This Encounter

## (undated) NOTE — LETTER
[address], [address] [facility] [address] [facility] [address]  [address]   [phone]    [address] [facility], [address] [place] [address], [address] [place] [address]   [phone]       August 17, 2017    [facility]

## (undated) NOTE — LETTER
Date: 1/6/2020    Patient Name: Eric Reilly          To Whom it may concern: This letter has been written at the patient's request.  Please allow [facility] to have water at her workstation.   If you have any further questions please contact me at the above nu

## (undated) NOTE — LETTER
Patient Name: Juan J Flanagan  YOB: 1950          MRN number:  [id number]  Date:  11/30/2017  Referring Physician: Dr. Stewart Hui     Discharge Summary  Pt has attended 4 visits in Physical Therapy.    Dx: Localized LBP         Authorized # of Visits: treatment options and has agreed to actively participate in planning and for this course of care. Thank you for your referral. If you have any questions, please contact me at Dept: [phone].     Sincerely,  Electronically signed by therapist: Stan Kendall

## (undated) NOTE — LETTER
Date & Time: 4/21/2025, 5:34 PM  Patient: Grace Guy  Encounter Provider(s):    Gloria Osorio APRN       To Whom It May Concern:    Grace Guy was seen and treated in our department on 4/21/2025. She can return to work on Sunday 27 th if symptoms have improved and is fever free.    If you have any questions or concerns, please do not hesitate to call.        _____________________________  Physician/APC Signature

## (undated) NOTE — LETTER
[address]  [facility] [address]  [phone]               Date: April 29th 2022      Patient Name: Andrae Edwards            YOB: 1950        To Whom It May Concern,    Pt had office visit 4/23/22 and is being treated for a medical condition. Pt may return to work on 5/4/2022.             Sincerely,  Devonte Choe MD

## (undated) NOTE — LETTER
[facility], [facility], [address] [place] [address] [phone]             Date: 3/29/2023    Patient name: Dianne Hernandez      YOB: 1950      To Whom It May Concern,      The above named Patient was seen in our office and being treated for a illness. Please excuse     Patient from work 3/21/2023 through 3/27/2023.       Sincerely,    Pravin Arevalo PA-C

## (undated) NOTE — MR AVS SNAPSHOT
[address]  [address] [address] [phone]               Thank you for choosing us for your health care visit with Yumiko Valencia MD.  We are glad to serve you and happy to provide you with this [address] [facility] [address] [phone]              Allergies as of Jan 26, 2017     Morphine Rash    Sulfa Antibiotics Rash, Itching                Today's Vital Signs     BP Pulse Temp Height Weight BMI    130/84 mmHg 78 97.8 °F (36.6 °C) (Oral) 63.5\" Instructions: To schedule an appointment for your radiology test please call [facility] Scheduling at [phone].          Immunizations Administered in the Office Today     [employer] Information     Your b

## (undated) NOTE — LETTER
August 17, 2017    Arnoldo Moura  [facility] [address]      Dear Mya Darby:    The following are the results of your recent tests ordered by [address] [place] [address] LAB. Please review the list of test results.   Your result is the number on the left; w WBC Clump Present (A) None Seen   Non-Squamous Epithelial None Seen None Seen       Your test result(s) {DMG_TEST RESULTS STATUS:1518}. If you have any questions about these test results, you may contact one of the nurses at our office.         Sincerely

## (undated) NOTE — LETTER
Date: 8/8/2022    Patient Name: Flor Irizarry          To Whom it may concern: Lonnie Pablo is currently under my care. She is medically cleared to return to work on 8/14/2022 with no restrictions. Please contact our office if you require any additional information.        Sincerely,    Merle Persaud PA-C